# Patient Record
Sex: FEMALE | Race: WHITE | NOT HISPANIC OR LATINO | Employment: UNEMPLOYED | URBAN - METROPOLITAN AREA
[De-identification: names, ages, dates, MRNs, and addresses within clinical notes are randomized per-mention and may not be internally consistent; named-entity substitution may affect disease eponyms.]

---

## 2017-01-21 ENCOUNTER — COMMUNICATION - HEALTHEAST (OUTPATIENT)
Dept: FAMILY MEDICINE | Facility: CLINIC | Age: 62
End: 2017-01-21

## 2017-01-21 DIAGNOSIS — N32.81 OVERACTIVE BLADDER: ICD-10-CM

## 2017-02-02 ENCOUNTER — OFFICE VISIT - HEALTHEAST (OUTPATIENT)
Dept: FAMILY MEDICINE | Facility: CLINIC | Age: 62
End: 2017-02-02

## 2017-02-02 DIAGNOSIS — L57.0 ACTINIC KERATOSIS: ICD-10-CM

## 2017-02-02 DIAGNOSIS — N32.81 OVERACTIVE BLADDER: ICD-10-CM

## 2017-03-07 ENCOUNTER — HOSPITAL ENCOUNTER (OUTPATIENT)
Dept: MAMMOGRAPHY | Facility: CLINIC | Age: 62
Discharge: HOME | End: 2017-03-07
Attending: FAMILY MEDICINE

## 2017-03-07 DIAGNOSIS — Z12.31 VISIT FOR SCREENING MAMMOGRAM: ICD-10-CM

## 2017-06-08 ENCOUNTER — COMMUNICATION - HEALTHEAST (OUTPATIENT)
Dept: FAMILY MEDICINE | Facility: CLINIC | Age: 62
End: 2017-06-08

## 2017-06-08 DIAGNOSIS — K59.00 UNSPECIFIED CONSTIPATION: ICD-10-CM

## 2017-06-09 ENCOUNTER — COMMUNICATION - HEALTHEAST (OUTPATIENT)
Dept: FAMILY MEDICINE | Facility: CLINIC | Age: 62
End: 2017-06-09

## 2017-06-30 ENCOUNTER — OFFICE VISIT - HEALTHEAST (OUTPATIENT)
Dept: FAMILY MEDICINE | Facility: CLINIC | Age: 62
End: 2017-06-30

## 2017-06-30 DIAGNOSIS — K59.04 CHRONIC IDIOPATHIC CONSTIPATION: ICD-10-CM

## 2017-06-30 DIAGNOSIS — R35.0 INCREASED URINARY FREQUENCY: ICD-10-CM

## 2017-06-30 DIAGNOSIS — E66.3 OVERWEIGHT (BMI 25.0-29.9): ICD-10-CM

## 2017-06-30 ASSESSMENT — MIFFLIN-ST. JEOR: SCORE: 1310.39

## 2017-07-10 ENCOUNTER — COMMUNICATION - HEALTHEAST (OUTPATIENT)
Dept: FAMILY MEDICINE | Facility: CLINIC | Age: 62
End: 2017-07-10

## 2017-07-16 ENCOUNTER — RECORDS - HEALTHEAST (OUTPATIENT)
Dept: ADMINISTRATIVE | Facility: OTHER | Age: 62
End: 2017-07-16

## 2017-10-11 ENCOUNTER — RECORDS - HEALTHEAST (OUTPATIENT)
Dept: ADMINISTRATIVE | Facility: OTHER | Age: 62
End: 2017-10-11

## 2017-12-01 ENCOUNTER — RECORDS - HEALTHEAST (OUTPATIENT)
Dept: ADMINISTRATIVE | Facility: OTHER | Age: 62
End: 2017-12-01

## 2017-12-27 ENCOUNTER — OFFICE VISIT - HEALTHEAST (OUTPATIENT)
Dept: FAMILY MEDICINE | Facility: CLINIC | Age: 62
End: 2017-12-27

## 2017-12-27 DIAGNOSIS — L82.1 OTHER SEBORRHEIC KERATOSIS: ICD-10-CM

## 2017-12-27 DIAGNOSIS — R05.9 COUGH: ICD-10-CM

## 2017-12-27 ASSESSMENT — MIFFLIN-ST. JEOR: SCORE: 1265.03

## 2018-01-17 ENCOUNTER — AMBULATORY - HEALTHEAST (OUTPATIENT)
Dept: FAMILY MEDICINE | Facility: CLINIC | Age: 63
End: 2018-01-17

## 2018-01-17 DIAGNOSIS — L98.9 SKIN LESIONS: ICD-10-CM

## 2018-07-02 ENCOUNTER — OFFICE VISIT - HEALTHEAST (OUTPATIENT)
Dept: FAMILY MEDICINE | Facility: CLINIC | Age: 63
End: 2018-07-02

## 2018-07-02 DIAGNOSIS — R05.9 COUGH: ICD-10-CM

## 2018-07-02 DIAGNOSIS — J18.9 COMMUNITY ACQUIRED PNEUMONIA, UNSPECIFIED LATERALITY: ICD-10-CM

## 2018-07-02 ASSESSMENT — MIFFLIN-ST. JEOR: SCORE: 1231.01

## 2018-07-09 ENCOUNTER — OFFICE VISIT - HEALTHEAST (OUTPATIENT)
Dept: FAMILY MEDICINE | Facility: CLINIC | Age: 63
End: 2018-07-09

## 2018-07-09 DIAGNOSIS — J18.9 COMMUNITY ACQUIRED PNEUMONIA, UNSPECIFIED LATERALITY: ICD-10-CM

## 2018-09-17 ENCOUNTER — RECORDS - HEALTHEAST (OUTPATIENT)
Dept: ADMINISTRATIVE | Facility: OTHER | Age: 63
End: 2018-09-17

## 2018-11-05 ENCOUNTER — OFFICE VISIT - HEALTHEAST (OUTPATIENT)
Dept: FAMILY MEDICINE | Facility: CLINIC | Age: 63
End: 2018-11-05

## 2018-11-05 DIAGNOSIS — K59.00 CONSTIPATION: ICD-10-CM

## 2018-11-05 DIAGNOSIS — Z01.818 PRE-OPERATIVE EXAMINATION: ICD-10-CM

## 2018-11-05 DIAGNOSIS — N81.4 UTERINE PROLAPSE: ICD-10-CM

## 2018-11-05 DIAGNOSIS — N32.81 OVERACTIVE BLADDER: ICD-10-CM

## 2018-11-05 DIAGNOSIS — Z12.11 COLON CANCER SCREENING: ICD-10-CM

## 2018-11-05 LAB
ATRIAL RATE - MUSE: 55 BPM
DIASTOLIC BLOOD PRESSURE - MUSE: NORMAL MMHG
ERYTHROCYTE [DISTWIDTH] IN BLOOD BY AUTOMATED COUNT: 12 % (ref 11–14.5)
HCT VFR BLD AUTO: 42.4 % (ref 35–47)
HGB BLD-MCNC: 14.4 G/DL (ref 12–16)
INTERPRETATION ECG - MUSE: NORMAL
MCH RBC QN AUTO: 30.2 PG (ref 27–34)
MCHC RBC AUTO-ENTMCNC: 34 G/DL (ref 32–36)
MCV RBC AUTO: 89 FL (ref 80–100)
P AXIS - MUSE: 57 DEGREES
PLATELET # BLD AUTO: 248 THOU/UL (ref 140–440)
PMV BLD AUTO: 7.8 FL (ref 7–10)
PR INTERVAL - MUSE: 162 MS
QRS DURATION - MUSE: 74 MS
QT - MUSE: 414 MS
QTC - MUSE: 396 MS
R AXIS - MUSE: 32 DEGREES
RBC # BLD AUTO: 4.77 MILL/UL (ref 3.8–5.4)
SYSTOLIC BLOOD PRESSURE - MUSE: NORMAL MMHG
T AXIS - MUSE: 57 DEGREES
VENTRICULAR RATE- MUSE: 55 BPM
WBC: 6.9 THOU/UL (ref 4–11)

## 2018-11-05 ASSESSMENT — MIFFLIN-ST. JEOR: SCORE: 1227.15

## 2018-11-06 ENCOUNTER — COMMUNICATION - HEALTHEAST (OUTPATIENT)
Dept: FAMILY MEDICINE | Facility: CLINIC | Age: 63
End: 2018-11-06

## 2018-11-27 ENCOUNTER — RECORDS - HEALTHEAST (OUTPATIENT)
Dept: ADMINISTRATIVE | Facility: OTHER | Age: 63
End: 2018-11-27

## 2018-11-28 ASSESSMENT — MIFFLIN-ST. JEOR: SCORE: 1223.98

## 2018-12-03 ENCOUNTER — ANESTHESIA - HEALTHEAST (OUTPATIENT)
Dept: SURGERY | Facility: CLINIC | Age: 63
End: 2018-12-03

## 2018-12-04 ENCOUNTER — SURGERY - HEALTHEAST (OUTPATIENT)
Dept: SURGERY | Facility: CLINIC | Age: 63
End: 2018-12-04

## 2018-12-04 ASSESSMENT — MIFFLIN-ST. JEOR: SCORE: 1221.71

## 2018-12-10 ENCOUNTER — COMMUNICATION - HEALTHEAST (OUTPATIENT)
Dept: FAMILY MEDICINE | Facility: CLINIC | Age: 63
End: 2018-12-10

## 2019-06-04 ENCOUNTER — RECORDS - HEALTHEAST (OUTPATIENT)
Dept: ADMINISTRATIVE | Facility: OTHER | Age: 64
End: 2019-06-04

## 2019-09-16 ENCOUNTER — OFFICE VISIT - HEALTHEAST (OUTPATIENT)
Dept: FAMILY MEDICINE | Facility: CLINIC | Age: 64
End: 2019-09-16

## 2019-09-16 DIAGNOSIS — D64.9 POSTOPERATIVE ANEMIA: ICD-10-CM

## 2019-09-16 DIAGNOSIS — Z23 IMMUNIZATION DUE: ICD-10-CM

## 2019-09-16 DIAGNOSIS — Z13.220 LIPID SCREENING: ICD-10-CM

## 2019-09-16 DIAGNOSIS — L98.9 SKIN LESION: ICD-10-CM

## 2019-09-16 DIAGNOSIS — Z78.0 ASYMPTOMATIC MENOPAUSAL STATE: ICD-10-CM

## 2019-09-16 DIAGNOSIS — Z00.00 ROUTINE GENERAL MEDICAL EXAMINATION AT A HEALTH CARE FACILITY: ICD-10-CM

## 2019-09-16 DIAGNOSIS — Z12.4 SCREENING FOR MALIGNANT NEOPLASM OF CERVIX: ICD-10-CM

## 2019-09-16 DIAGNOSIS — Z13.1 SCREENING FOR DIABETES MELLITUS: ICD-10-CM

## 2019-09-16 LAB
ANION GAP SERPL CALCULATED.3IONS-SCNC: 11 MMOL/L (ref 5–18)
BUN SERPL-MCNC: 16 MG/DL (ref 8–22)
CALCIUM SERPL-MCNC: 9.6 MG/DL (ref 8.5–10.5)
CHLORIDE BLD-SCNC: 105 MMOL/L (ref 98–107)
CHOLEST SERPL-MCNC: 246 MG/DL
CO2 SERPL-SCNC: 24 MMOL/L (ref 22–31)
CREAT SERPL-MCNC: 0.79 MG/DL (ref 0.6–1.1)
ERYTHROCYTE [DISTWIDTH] IN BLOOD BY AUTOMATED COUNT: 13 % (ref 11–14.5)
FASTING STATUS PATIENT QL REPORTED: YES
GFR SERPL CREATININE-BSD FRML MDRD: >60 ML/MIN/1.73M2
GLUCOSE BLD-MCNC: 93 MG/DL (ref 70–125)
HCT VFR BLD AUTO: 41.9 % (ref 35–47)
HDLC SERPL-MCNC: 85 MG/DL
HGB BLD-MCNC: 14.6 G/DL (ref 12–16)
LDLC SERPL CALC-MCNC: 148 MG/DL
MCH RBC QN AUTO: 31.1 PG (ref 27–34)
MCHC RBC AUTO-ENTMCNC: 34.9 G/DL (ref 32–36)
MCV RBC AUTO: 89 FL (ref 80–100)
PLATELET # BLD AUTO: 236 THOU/UL (ref 140–440)
PMV BLD AUTO: 7.6 FL (ref 7–10)
POTASSIUM BLD-SCNC: 4.3 MMOL/L (ref 3.5–5)
RBC # BLD AUTO: 4.71 MILL/UL (ref 3.8–5.4)
SODIUM SERPL-SCNC: 140 MMOL/L (ref 136–145)
TRIGL SERPL-MCNC: 65 MG/DL
WBC: 5.5 THOU/UL (ref 4–11)

## 2019-09-16 ASSESSMENT — MIFFLIN-ST. JEOR: SCORE: 1217.41

## 2019-09-17 LAB
HPV SOURCE: NORMAL
HUMAN PAPILLOMA VIRUS 16 DNA: NEGATIVE
HUMAN PAPILLOMA VIRUS 18 DNA: NEGATIVE
HUMAN PAPILLOMA VIRUS FINAL DIAGNOSIS: NORMAL
HUMAN PAPILLOMA VIRUS OTHER HR: NEGATIVE
SPECIMEN DESCRIPTION: NORMAL

## 2019-09-23 ENCOUNTER — COMMUNICATION - HEALTHEAST (OUTPATIENT)
Dept: FAMILY MEDICINE | Facility: CLINIC | Age: 64
End: 2019-09-23

## 2019-09-24 ENCOUNTER — COMMUNICATION - HEALTHEAST (OUTPATIENT)
Dept: FAMILY MEDICINE | Facility: CLINIC | Age: 64
End: 2019-09-24

## 2019-10-28 ENCOUNTER — RECORDS - HEALTHEAST (OUTPATIENT)
Dept: BONE DENSITY | Facility: CLINIC | Age: 64
End: 2019-10-28

## 2019-10-28 ENCOUNTER — RECORDS - HEALTHEAST (OUTPATIENT)
Dept: ADMINISTRATIVE | Facility: OTHER | Age: 64
End: 2019-10-28

## 2019-10-28 DIAGNOSIS — Z78.0 ASYMPTOMATIC MENOPAUSAL STATE: ICD-10-CM

## 2019-11-04 ENCOUNTER — COMMUNICATION - HEALTHEAST (OUTPATIENT)
Dept: FAMILY MEDICINE | Facility: CLINIC | Age: 64
End: 2019-11-04

## 2019-11-05 ENCOUNTER — COMMUNICATION - HEALTHEAST (OUTPATIENT)
Dept: FAMILY MEDICINE | Facility: CLINIC | Age: 64
End: 2019-11-05

## 2019-11-08 ENCOUNTER — RECORDS - HEALTHEAST (OUTPATIENT)
Dept: ADMINISTRATIVE | Facility: OTHER | Age: 64
End: 2019-11-08

## 2019-11-12 ENCOUNTER — OFFICE VISIT (OUTPATIENT)
Dept: VASCULAR SURGERY | Facility: CLINIC | Age: 64
End: 2019-11-12
Payer: COMMERCIAL

## 2019-11-12 PROCEDURE — 99203 OFFICE O/P NEW LOW 30 MIN: CPT | Performed by: SURGERY

## 2019-11-12 NOTE — PROGRESS NOTES
[facility]SULTATION    HPI:    Nandini Yang is a pleasant 64 year old female who presents with complaints of right lower extremity pain and varicose veins.  Varicose veins have been present for 2 to 3 years but numerous telangiectasias have been present since she was in her 30s.. The pain is described as an aching, tiredness, heaviness, throbbing, worse when standing for long periods of time and improving with walking or exercise.  The patient has used compression hose intermittently for years.    She has no history of deep vein thrombosis, superficial thrombophlebitis or bleeding from her veins.  She does feel that her symptoms interfere with actives of daily living because it make it difficult for her to stand for long peers of time due to the throbbing, caused her to sit and elevate her legs.    PAST MEDICAL HISTORY:   Restless leg syndrome    PAST SURGICAL HISTORY:   Anterior and posterior vaginal repair, breast biopsy 1999, cystoscopy 2018, laparoscopic supracervical hysterectomy with bilateral salpingo-oophorectomy December 2018    FAMILY HISTORY:   Negative for varicose veins    SOCIAL HISTORY:   Non-smoker, does not use alcohol    REVIEW OF SYSTEMS: Review Of Systems  Skin: negative  Eyes: negative  Ears/Nose/Throat: negative  Respiratory: No shortness of breath, dyspnea on exertion, cough, or hemoptysis  Cardiovascular: negative  Gastrointestinal: negative  Genitourinary: negative  Musculoskeletal: back pain  Neurologic: negative  Psychiatric: Anxiety, panic attack  Hematologic/Lymphatic/Immunologic: negative  Endocrine: negative      Vital signs:  There were no vitals taken for this visit.    No current outpatient medications on file.       PHYSICAL EXAM:  General: Pleasant, NAD.   HEENT: Normocephalic, atraumatic, external ears and nose normal.   Respiratory: Normal respiratory effort.   Cardiovascular: Pulse is regular.   Musculoskeletal: Gait and station normal.  The joints of her fingers and  toes without deformity.  There is no cyanosis of her nailbeds.   EXTREMITIES: Right lower extremity: 4 mm varicosities about the posterior medial right thigh and extending posteriorly cephalad toward the buttock crease.  Venous stasis changes and no significant edema.  Scattered telangiectasias.  Left lower extremity: No significant varicose veins are appreciated.  She has a few scattered telangiectasias but no edema or venous stasis changes.  PULSES: R/L (3=normal pulse, 0=no palpable pulse) dorsalis pedis: 3/3; posterior tibial: 3/3.      Neurologic: Grossly normal  Psychiatric: Mood, affect, judgment and insight are normal     ASSESSMENT:  CEAP 2 right lower extremity chronic venous insufficiency.  We discussed options of continued conservative management with use of compression hose, leg elevation, dietary measures and exercise.  She has pursued these measures but feels in spite of the exercise etc., her symptoms are interfering with actives of daily living.  Therefore she would like to pursue venous duplex ultrasound to assess for underlying valve incompetence.  These may well be due to pelvic source varicose veins.    PLAN:  Right lower extremity venous duplex ultrasound.  She may wish to have cosmetic sclerotherapy of telangiectasias.  Details and risks were discussed.  Estimates were given.     Cade Esquivel MD

## 2019-11-20 DIAGNOSIS — I83.811 VARICOSE VEINS OF RIGHT LOWER EXTREMITY WITH PAIN: Primary | ICD-10-CM

## 2019-12-06 ENCOUNTER — RECORDS - HEALTHEAST (OUTPATIENT)
Dept: ADMINISTRATIVE | Facility: OTHER | Age: 64
End: 2019-12-06

## 2020-01-21 ENCOUNTER — OFFICE VISIT (OUTPATIENT)
Dept: VASCULAR SURGERY | Facility: CLINIC | Age: 65
End: 2020-01-21

## 2020-01-21 ENCOUNTER — ANCILLARY PROCEDURE (OUTPATIENT)
Dept: ULTRASOUND IMAGING | Facility: CLINIC | Age: 65
End: 2020-01-21
Attending: SURGERY
Payer: COMMERCIAL

## 2020-01-21 ENCOUNTER — MEDICAL CORRESPONDENCE (OUTPATIENT)
Dept: HEALTH INFORMATION MANAGEMENT | Facility: CLINIC | Age: 65
End: 2020-01-21

## 2020-01-21 ENCOUNTER — OFFICE VISIT (OUTPATIENT)
Dept: VASCULAR SURGERY | Facility: CLINIC | Age: 65
End: 2020-01-21
Payer: COMMERCIAL

## 2020-01-21 DIAGNOSIS — Z53.9 ERRONEOUS ENCOUNTER--DISREGARD: Primary | ICD-10-CM

## 2020-01-21 DIAGNOSIS — I78.1 SPIDER TELANGIECTASIS OF SKIN: Primary | ICD-10-CM

## 2020-01-21 DIAGNOSIS — I83.811 VARICOSE VEINS OF RIGHT LOWER EXTREMITY WITH PAIN: ICD-10-CM

## 2020-01-21 PROCEDURE — 36468 NJX SCLRSNT SPIDER VEINS: CPT | Performed by: SURGERY

## 2020-01-21 PROCEDURE — 93971 EXTREMITY STUDY: CPT | Mod: RT | Performed by: SURGERY

## 2020-01-21 PROCEDURE — 99213 OFFICE O/P EST LOW 20 MIN: CPT | Performed by: SURGERY

## 2020-01-21 PROCEDURE — S9999 SALES TAX: HCPCS | Performed by: SURGERY

## 2020-01-21 RX ORDER — QUETIAPINE 150 MG/1
150 TABLET, FILM COATED, EXTENDED RELEASE ORAL DAILY
COMMUNITY
Start: 2019-12-30 | End: 2022-11-23

## 2020-01-21 RX ORDER — HYDROXYZINE PAMOATE 50 MG/1
50 CAPSULE ORAL PRN
COMMUNITY
Start: 2019-06-13 | End: 2022-11-23

## 2020-01-21 RX ORDER — CLONAZEPAM 1 MG/1
1 TABLET ORAL PRN
COMMUNITY
End: 2021-09-21

## 2020-01-21 RX ORDER — FLUTICASONE PROPIONATE 50 MCG
50 SPRAY, SUSPENSION (ML) NASAL PRN
COMMUNITY

## 2020-01-21 RX ORDER — CLONAZEPAM 1 MG/1
1 TABLET ORAL PRN
Refills: 2 | COMMUNITY
Start: 2019-08-23 | End: 2022-11-23

## 2020-01-22 NOTE — PROGRESS NOTES
[facility] Note    Nandini Yang presents in follow-up of right lower extremity pain and varicose veins.  Please see my consultation of 11/12/2019 for details.  She returns today for right lower extremity venous duplex ultrasound.    Physical Exam  General: Pleasant female in no acute distress.  Blood pressure 108/75, pulse 75  Extremities: Right lower extremity: She has 4 to 5 mm varicosities coursing from the proximal posterior right thigh, down the posterior thigh, to the lateral popliteal space and onto the posterior lateral right leg.  There are numerous reticular veins and telangiectasias over the posterior thigh and also over her anterior right leg and ankle.    Left lower extremity: No varicose veins are appreciated.  She has scattered reticular veins and spider veins over the thigh and leg.  There are no venous stasis changes or edema.    Ultrasound:  Right lower extremity: No evidence of deep vein thrombosis.  The right common femoral vein is incompetent.  The right great saphenous vein, small saphenous vein, vein of Giacomini and anterior extensor saphenous veins are competent.  There is a pelvic source varicose vein measuring 5.6 mm in diameter with reflux time of 676 ms coursing down the posterior thigh.    Left lower extremity: No evidence of deep vein thrombosis with normal phasicity, compression and augmentation in the left common femoral vein    Assessment:  Her left leg pain and varicose veins arise from a pelvic source, not from saphenous incompetence.  Her symptoms are interfering with actives of daily living despite the use of compression hose for more than 3 months.    We discussed options of continued conservative management with use of compression hose, leg elevation, dietary measures and exercise.  As she has pursued these measures and has not had relief of her discomfort, I feel that these veins could be treated with ultrasound-guided sclerotherapy.  This would be considered  medically necessary, given her symptoms and failure of conservative measures.    Details of the procedure including risks of allergic reaction, deep vein thrombosis, ulceration, hyperpigmentation, superficial thrombophlebitis and matting were discussed.  The patient voiced understanding and her questions were answered.    Plan:  We will submit this for insurance approval and hopefully can proceed in the near future.  Estimates were given.    The patient is interested in cosmetic sclerotherapy for telangiectasias over both lower extremities.  She understands that cosmetic sclerotherapy will not be covered by insurance and will be her responsibility.  Please see the below procedure note regarding the cosmetic sclerotherapy.    Cade Esquivel MD                 [facility] Procedure Note    Nandini PATEL Trey  January 22, 2020    Details of the procedure including risks of allergic reaction, ulceration, hyperpigmentation and trapped blood were discussed.  Patient voiced understanding and wished to proceed.  Informed consent was obtained.    I donned the Syris headlight and injected numerous telangiectasias from the ankles to her thighs bilaterally.  The varicose vein on the posterior right thigh and popliteal fossa were not injected as this will need to be treated with medically necessary ultrasound-guided sclerotherapy at a later date.    We used 0.5% polidocanol in a 1 part polidocanol to 2 parts air mixture using a total of 3 syringes, 9 mL) of foam.    She tolerated procedure well but evidence of allergic reaction or other complications.  We had her walk for 10 minutes prior to getting car to drive home after donning thigh-high compression hose.  She will return in 6 weeks in follow-up.    Sclerotherapy  Date/Time: 1/22/2020 8:29 AM  Performed by: Cade Esquivel MD  Authorized by: Cade Esquivel MD     Time out: Immediately prior to the procedure a time out was called    Type:   Cosmetic  Session:  Full  Procedure side:  Bilateral  Solution/Amount:  .5% STD  Syringes::  3 (9 mL)  Patient tolerance:  Patient tolerated the procedure well with no immediate complications  Wrap/Hose:  Chuck Esquivel MD

## 2020-03-09 ENCOUNTER — RECORDS - HEALTHEAST (OUTPATIENT)
Dept: ADMINISTRATIVE | Facility: OTHER | Age: 65
End: 2020-03-09

## 2020-04-13 ENCOUNTER — TELEPHONE (OUTPATIENT)
Dept: VASCULAR SURGERY | Facility: CLINIC | Age: 65
End: 2020-04-13

## 2020-04-13 NOTE — TELEPHONE ENCOUNTER
"April 13, 2020    Patient called [facility] requesting an appointment with Dr. Esquivel.     Patient stated that she was scheduled for a \"very important appointment\" on 3/17/2020 but it was cancelled due to COVID-19.     Routing to [facility] RN Pool for review and correspondence.     [facility]  [facility]    Office: [phone]  Fax: [phone]   "

## 2020-04-14 NOTE — TELEPHONE ENCOUNTER
Called pt back and had to [facility]. Informed pt that she is on our reschedule list and we will be contacting her to get her 3/17 appt rescheduled as soon as our clinic opens back up. Informed pt that Dr. Esquivel is only doing virtual (video or phone) visits at this time due to the current health crisis and no patients of ours are being seen in the clinic unless it is an emergent need and pt was scheduled for sclero and U/S guided sclero which we will not be able to do until our clinic is back open. Informed pt that if she has further questions or concerns, to call us back at our main number ([phone]) if she'd like to set up a virtual visit (Dr. Esquivel is available Monday 4/27 AM and Tuesday 4/28 AM) or if she has any other questions or concerns and those may have to be relayed to me as I am working remotely at this time.    Amber Dowd, RN

## 2020-09-08 ENCOUNTER — RECORDS - HEALTHEAST (OUTPATIENT)
Dept: ADMINISTRATIVE | Facility: OTHER | Age: 65
End: 2020-09-08

## 2020-09-18 ENCOUNTER — OFFICE VISIT - HEALTHEAST (OUTPATIENT)
Dept: FAMILY MEDICINE | Facility: CLINIC | Age: 65
End: 2020-09-18

## 2020-09-18 DIAGNOSIS — Z13.220 LIPID SCREENING: ICD-10-CM

## 2020-09-18 DIAGNOSIS — L82.0 INFLAMED SEBORRHEIC KERATOSIS: ICD-10-CM

## 2020-09-18 DIAGNOSIS — G47.00 INSOMNIA, UNSPECIFIED TYPE: ICD-10-CM

## 2020-09-18 DIAGNOSIS — Z00.00 ROUTINE GENERAL MEDICAL EXAMINATION AT A HEALTH CARE FACILITY: ICD-10-CM

## 2020-09-18 DIAGNOSIS — Z23 IMMUNIZATION DUE: ICD-10-CM

## 2020-09-18 DIAGNOSIS — G47.09 OTHER INSOMNIA: ICD-10-CM

## 2020-09-18 DIAGNOSIS — Z13.1 SCREENING FOR DIABETES MELLITUS: ICD-10-CM

## 2020-09-18 LAB
ALBUMIN SERPL-MCNC: 4.1 G/DL (ref 3.5–5)
ALP SERPL-CCNC: 77 U/L (ref 45–120)
ALT SERPL W P-5'-P-CCNC: 14 U/L (ref 0–45)
ANION GAP SERPL CALCULATED.3IONS-SCNC: 9 MMOL/L (ref 5–18)
AST SERPL W P-5'-P-CCNC: 19 U/L (ref 0–40)
BILIRUB SERPL-MCNC: 0.9 MG/DL (ref 0–1)
BUN SERPL-MCNC: 15 MG/DL (ref 8–22)
CALCIUM SERPL-MCNC: 9.4 MG/DL (ref 8.5–10.5)
CHLORIDE BLD-SCNC: 105 MMOL/L (ref 98–107)
CHOLEST SERPL-MCNC: 235 MG/DL
CO2 SERPL-SCNC: 26 MMOL/L (ref 22–31)
CREAT SERPL-MCNC: 0.77 MG/DL (ref 0.6–1.1)
FASTING STATUS PATIENT QL REPORTED: YES
GFR SERPL CREATININE-BSD FRML MDRD: >60 ML/MIN/1.73M2
GLUCOSE BLD-MCNC: 94 MG/DL (ref 70–125)
HDLC SERPL-MCNC: 78 MG/DL
LDLC SERPL CALC-MCNC: 136 MG/DL
POTASSIUM BLD-SCNC: 4.5 MMOL/L (ref 3.5–5)
PROT SERPL-MCNC: 6.6 G/DL (ref 6–8)
SODIUM SERPL-SCNC: 140 MMOL/L (ref 136–145)
TRIGL SERPL-MCNC: 106 MG/DL

## 2020-09-18 ASSESSMENT — MIFFLIN-ST. JEOR: SCORE: 1217.18

## 2020-09-22 ENCOUNTER — COMMUNICATION - HEALTHEAST (OUTPATIENT)
Dept: FAMILY MEDICINE | Facility: CLINIC | Age: 65
End: 2020-09-22

## 2020-09-24 ENCOUNTER — COMMUNICATION - HEALTHEAST (OUTPATIENT)
Dept: FAMILY MEDICINE | Facility: CLINIC | Age: 65
End: 2020-09-24

## 2020-10-01 ENCOUNTER — COMMUNICATION - HEALTHEAST (OUTPATIENT)
Dept: FAMILY MEDICINE | Facility: CLINIC | Age: 65
End: 2020-10-01

## 2020-10-06 ENCOUNTER — OFFICE VISIT (OUTPATIENT)
Dept: VASCULAR SURGERY | Facility: CLINIC | Age: 65
End: 2020-10-06
Payer: COMMERCIAL

## 2020-10-06 DIAGNOSIS — I83.811 VARICOSE VEINS OF RIGHT LOWER EXTREMITY WITH PAIN: Primary | ICD-10-CM

## 2020-10-06 PROCEDURE — 36471 NJX SCLRSNT MLT INCMPTNT VN: CPT | Mod: RT | Performed by: SURGERY

## 2020-10-06 PROCEDURE — 76942 ECHO GUIDE FOR BIOPSY: CPT | Performed by: SURGERY

## 2020-10-06 NOTE — LETTER
10/6/2020         RE: Nandini Yang  [id number] [facility] [id number]        Dear Colleague,    Thank you for referring your patient, Nandini Yang, to the [facility] [place]. Please see a copy of my visit note below.      Procedure Description  Details the procedure including risks of allergic reaction, deep vein thrombosis, ulceration, hyperpigmentation, superficial thrombophlebitis were all discussed with the patient.  Patient voiced understanding, questions were answered and informed consent obtained.    Under ultrasound guidance, the *** lower extremity symptomatic, incompetent *** tributaries were localized and a 27-gauge needle directed into them. *** Polidocanol foam in a one part polidocanol to 2.5 parts air mixture was injected into the veins under ultrasound guidance.  The veins were noted to be in tight spasm and filled with solution. ***   I had the patient perform ankle pumps on the table.  After completing the sclerotherapy, the leg was cleaned with saline solution and compression hose applied.  We had the patient walk for 10 minutes prior to leaving the clinic. She tolerated the procedure well without evidence of allergic reaction or other complications.  Post procedure instructions and restrictions were given.    The patient will return in 4 weeks for follow-up.    Cade Esquivel MD  CP [facility]    Schedule description  Details of the procedure including risks of allergic reaction, deep vein thrombosis, ulceration, hyperpigmentation, superficial thrombophlebitis and the need for more than 1 session were discussed.  The patient voiced understanding and wished to proceed.  Informed consent was obtained.    I had the patient lie prone on our procedure room table at the table in slight reverse Trendelenburg position.  I interrogated the proximal posterior medial right thigh and noted a sizable varicosities approximately 3 to 4 cm distal to the gluteal fold,  isolated them under ultrasound guidance and directed a 27-gauge needle into them injecting 1% polidocanol foam in a 1 part polidocanol to 2.5 parts air mixture.  I then marched down the posterior thigh injecting at the mid thigh, distal lateral thigh and in the proximal mid posterior calf as the vein coursing down the thigh and calf.  I feel the vein nicely from the proximal mid calf all the way up to the gluteal fold.    Had the patient perform ankle pumps.  We cleaned the leg and then had her don thigh-high compression and had her walk for 10 to 15 minutes prior to getting a car drive home.  Post procedure instructions were given in verbal and written form questions answered.  Tolerated the procedure well without evidence of allergic reaction or other complications.    She will return in 1 month in follow-up with possible treatment at that time.  Additionally, she may wish to have some treatment of telangiectasias for cosmetic purposes with full understanding that treatment of the telangiectasias will not be covered by insurance and will be her responsibility.  She voiced understanding.    There were no vitals taken for this visit.    LAI Esquivel MD    Dictated using Dragon voice recognition software which may result in transcription errors      [facility] Procedure Note    Nandini Yang  October 6, 2020    Nandini Yang is a 65 year old year old female. She presents for Sclerotherapy      There were no vitals taken for this visit.    Flowsheet Data 10/6/2020   Side: Left       Sclerotherapy    Date/Time: 10/6/2020 4:50 PM  Performed by: Cade Esquivel MD  Authorized by: Cade Esquivel MD     Time out: Immediately prior to the procedure a time out was called    Type:  Medically Necessary  Vein:  Multiple Veins  Yes    Procedure side:  Right  Solution/Amount:  1% POLIDOCANOL  Syringes::  2  Patient tolerance:  Patient tolerated the procedure well with no immediate  complications        Cade Esquivel MD      Again, thank you for allowing me to participate in the care of your patient.        Sincerely,        Cade Esquivel MD

## 2020-10-06 NOTE — PROGRESS NOTES
Procedure description  Details of the procedure including risks of allergic reaction, deep vein thrombosis, ulceration, hyperpigmentation, superficial thrombophlebitis and the need for more than 1 session were discussed.  The patient voiced understanding and wished to proceed.  Informed consent was obtained.    I had the patient lie prone on our procedure room table at the table in slight reverse Trendelenburg position.  I interrogated the proximal posterior medial right thigh and noted a sizable varicosities approximately 3 to 4 cm distal to the gluteal fold, isolated them under ultrasound guidance and directed a 27-gauge needle into them injecting 1% polidocanol foam in a 1 part polidocanol to 2.5 parts air mixture.  I then marched down the posterior thigh injecting at the mid thigh, distal lateral thigh and in the proximal mid posterior calf as the vein coursing down the thigh and calf.  I feel the vein nicely from the proximal mid calf all the way up to the gluteal fold.    Had the patient perform ankle pumps.  We cleaned the leg and then had her don thigh-high compression and had her walk for 10 to 15 minutes prior to getting a car drive home.  Post procedure instructions were given in verbal and written form questions answered.  Tolerated the procedure well without evidence of allergic reaction or other complications.    She will return in 1 month in follow-up with possible treatment at that time.  Additionally, she may wish to have some treatment of telangiectasias for cosmetic purposes with full understanding that treatment of the telangiectasias will not be covered by insurance and will be her responsibility.  She voiced understanding.    There were no vitals taken for this visit.    LAI Esquivel MD    Dictated using Dragon voice recognition software which may result in transcription errors      [facility] Procedure Note    Nandini Yang  October 6, 2020    Sclerotherapy    Date/Time:  10/6/2020 4:50 PM  Performed by: Cade Esquivel MD  Authorized by: Cade Esquivel MD     Time out: Immediately prior to the procedure a time out was called    Type:  Medically Necessary  Vein:  Multiple Veins  Yes    Procedure side:  Right  Solution/Amount:  1% POLIDOCANOL  Syringes::  2  Patient tolerance:  Patient tolerated the procedure well with no immediate complications        Cade Esquivel MD

## 2020-11-08 ENCOUNTER — COMMUNICATION - HEALTHEAST (OUTPATIENT)
Dept: FAMILY MEDICINE | Facility: CLINIC | Age: 65
End: 2020-11-08

## 2020-11-08 DIAGNOSIS — G47.00 INSOMNIA, UNSPECIFIED TYPE: ICD-10-CM

## 2020-11-12 ENCOUNTER — TELEPHONE (OUTPATIENT)
Dept: VASCULAR SURGERY | Facility: CLINIC | Age: 65
End: 2020-11-12

## 2020-11-12 NOTE — TELEPHONE ENCOUNTER
Pt called stating the back of her right knee has some firmness and tends to throb when exercising. Pt had U/S guided sclerotherapy on 10/6/2020 with Dr. Esquivel. Explained to pt these symptoms are normal following injection of deeper varicose veins. The firmness and tenderness may take up to 3-6 months to resolve. However, it sounds like there is probably some trapped blood in that vein as well as pt stated it was darker colored too. Informed pt that Dr. Esquivel may need to release some trapped blood in that vein to allow it to heal quicker and release some of the pressure it sounds like she is feeling. Instructed pt to apply warm compress for comfort and to wear her compression while working out if that helps it feel better which she had said it does.    Pt has follow up appt on 12/1 with Dr. Esquivel.    Pt in agreement with plan and had no further questions.    Amber Dowd, BSN, RN  [facility] [employer]  [facility]

## 2020-12-01 ENCOUNTER — OFFICE VISIT (OUTPATIENT)
Dept: VASCULAR SURGERY | Facility: CLINIC | Age: 65
End: 2020-12-01

## 2020-12-01 DIAGNOSIS — I83.811 VARICOSE VEINS OF RIGHT LOWER EXTREMITY WITH PAIN: ICD-10-CM

## 2020-12-01 DIAGNOSIS — I78.1 SPIDER TELANGIECTASIS OF SKIN: Primary | ICD-10-CM

## 2020-12-01 PROCEDURE — S9999 SALES TAX: HCPCS | Performed by: SURGERY

## 2020-12-01 PROCEDURE — 36468 NJX SCLRSNT SPIDER VEINS: CPT | Performed by: SURGERY

## 2020-12-01 NOTE — PROGRESS NOTES
[facility] Procedure Note    Nandini Yang  December 1, 2020    Indications:  1.  Follow-up 4 weeks status post ultrasound-guided, medically necessary sclerotherapy of the source varicose veins right lower extremity  2. Bilateral lower extremity spider telangiectasias of cosmetic concern    Procedure:  1.  Bilateral lower extremity cosmetic sclerotherapy  2.  Evacuation intraluminal thrombus from previously treated pelvic source varicose vein    Procedure description  The patient returns following medication ultrasound-guided sclerotherapy of right lower extremity pelvic source varicose veins.  These coursed from the proximal posterior medial right thigh, down the posterior thigh, lateral to the right knee and onto the posterior lateral right calf.  The patient is concerned that she developed some discoloration overlying the treated vein especially on the distal posterior lateral right thigh and proximal lateral calf.    Indeed, on physical exam, she has developed rather impressive matting overlying the firm induration and mild ecchymosis from the underlying, treated varicose vein.  Spiders are quite numerous and the matting intense.    I discussed details of sclerotherapy including risks of allergic reaction, deep vein thrombosis, ulceration, hyperpigmentation and further matting.  The patient voiced understanding and agreed to proceed.  Informed consent was obtained.    Cosmetic sclerotherapy  I donned the Syris headlight and treated numerous telangiectasias from the ankles to just above the knees bilaterally.  I was very careful to treat the matted areas surrounding the varicose vein on the distal posterior lateral right thigh, lateral to the right knee and onto the posterior lateral right calf.  I feel that I was able to treat these quite well.  We also treated numerous telangiectasias and small reticular veins in the popliteal fossa's bilaterally.    Removal of trapped blood  I clean the skin overlying  the indurated, previously treated varicose vein on the distal posterior lateral right thigh, proximal lateral popliteal fossa and on the posterior lateral right calf with alcohol, made stab wounds with an 18-gauge needle and expressed rather copious amounts of trapped blood.  This helped relieve some of the firmness and induration.    The area was cleaned with alcohol and then cotton balls applied to the areas of expressed blood.    The patient then donned thigh-high compression hose and walk for 10 minutes prior to getting a car drive home.  She will return in 6 weeks in follow-up.  She tolerated the procedure well without evidence of allergic reaction of complications.    The matting on the distal posterior lateral right thigh, lateral right popliteal fossa and posterior lateral right calf will take several treatments to resolve.  I told the patient that this would likely take 3 months to a year to reach his maximal improvement and that she may be left with some mild hyperpigmentation or matting in the area.  Treatment of the area of matting will be at no charge to her.  She understands that 2-3 more sessions may be necessary.  She voiced understanding.    Sclerotherapy    Date/Time: 12/1/2020 2:13 PM  Performed by: Cade Esquivel MD  Authorized by: Cade Esquivel MD     Time out: Immediately prior to the procedure a time out was called    Type:  Cosmetic  Session:  Limited  Procedure side:  Bilateral  Solution/Amount:  .5 POLIDOCANOL  Syringes::  2 syringes  Evacuation of intraluminal thrombus under sterile conditions without complications.    Patient tolerance:  Patient tolerated the procedure well with no immediate complications  Wrap/Hose:  Chuck Esquivel MD    Dictated using Dragon voice recognition software which may result in transcription errors

## 2020-12-14 ENCOUNTER — TELEPHONE (OUTPATIENT)
Dept: VASCULAR SURGERY | Facility: CLINIC | Age: 65
End: 2020-12-14

## 2020-12-14 NOTE — TELEPHONE ENCOUNTER
"Saw KATHRIN about 2 weeks ago for a sclero and has some questions about that.  She has something called \"matting\".    "

## 2020-12-14 NOTE — TELEPHONE ENCOUNTER
"Called pt back. Pt was wondering if there is anything she can be doing for the matting that she has from the sclerotherapy she had done by Dr. Esquivel on 10/6/2020. According to Dr. Esquivel's note from 12/1/2020 when he followed up with patient: \"She has developed rather impressive matting overlying the firm induration and mild ecchymosis from the underlying, treated varicose vein. Spiders are quite numerous and the matting intense.\" Reminded pt of what Dr. Esquivel informed her of: \"I told the patient that this would likely take 3 months to a year to reach her maximal improvement and that she may be left with some mild hyperpigmentation or matting in the area.\"    Pt plans on doing more injections at her next appt on 1/12/2021. And understands that there isn't really anything else that she can be doing at this time other than continue to wear her compression stockings and being patient with the healing process.    Pt in agreement with plan and had no further questions.    Amber Dowd, JOAQUIMN, RN  [facility]  [facility]  "

## 2021-01-12 ENCOUNTER — OFFICE VISIT (OUTPATIENT)
Dept: VASCULAR SURGERY | Facility: CLINIC | Age: 66
End: 2021-01-12
Payer: COMMERCIAL

## 2021-01-12 DIAGNOSIS — I78.1 SPIDER TELANGIECTASIS OF SKIN: Primary | ICD-10-CM

## 2021-01-12 DIAGNOSIS — I83.91 ASYMPTOMATIC VARICOSE VEINS OF RIGHT LOWER EXTREMITY: ICD-10-CM

## 2021-01-12 PROCEDURE — 99207 PR VEINSOLUTIONS NO CHARGE VISIT: CPT | Performed by: SURGERY

## 2021-01-12 NOTE — LETTER
1/12/2021         RE: Nandini Yang  [id number] [facility] [address]        Dear Colleague,    Thank you for referring your patient, Nandini Yang, to the [facility] [place]. Please see a copy of my visit note below.        [facility] Procedure Note    Nandini Yang  January 13, 2021    Indications:  Telangiectatic matting as complication of ultrasound-guided sclerotherapy of right distal posterior lateral thigh and proximal lateral right leg varicose vein    Procedure:  No charge sclerotherapy to treat proliferative telangiectatic matting complicating cosmetic sclerotherapy right leg varicose vein    Procedure description  Patient had formed rather proliferative telangiectasias of the distal posterior lateral right thigh and possible posterior lateral right calf in the area of a treated varicose vein.  This was much worse at her visit 6 weeks ago and I am pleased to see that is made good progress.  We drained trapped blood from the injected varicosity and perform sclerotherapy of the area of matting 6 weeks ago.    On exam there still remain rather proliferative telangiectasias but much improved from her previous visit.  There is mild hyperpigmentation along the course of the treated varicosity which is again, much improved.    I discussed risks of sclerotherapy including allergic reaction, deep vein thrombosis, ulceration, hyperpigmentation and further matting.  She voiced understanding and wished to proceed.  Informed consent was obtained.    I donned the size headlight and injected telangiectasias over the distal posterior lateral right thigh and proximal lateral right leg with 0.5% polidocanol foam.  I feel like we feel this nicely.    We cleaned the leg and then had the patient don thigh-high compression.  We had her walk for 10 minutes prior to getting a car drive home.  She will return in 6 weeks in follow-up possibly for another session of no charge sclerotherapy to  this limited area.    She tolerated the procedure well without evidence of allergic reaction or other complications.    There were no vitals taken for this visit.    Flowsheet Data 12/1/2020   Side: Right       Sclerotherapy    Date/Time: 1/13/2021 1:10 PM  Performed by: Cade Esquivel MD  Authorized by: Cade Esquivel MD     Time out: Immediately prior to the procedure a time out was called    Type:  No Charge  Procedure side:  Right  Solution/Amount:  .5 POLIDOCANOL  Syringes::  1  Patient tolerance:  Patient tolerated the procedure well with no immediate complications  Wrap/Hose:  Hose        Cade Esquivel MD      Again, thank you for allowing me to participate in the care of your patient.        Sincerely,        Cade Esquivel MD

## 2021-01-13 ENCOUNTER — RECORDS - HEALTHEAST (OUTPATIENT)
Dept: ADMINISTRATIVE | Facility: OTHER | Age: 66
End: 2021-01-13

## 2021-01-13 NOTE — PROGRESS NOTES
[facility] Procedure Note    Nandini Yang  January 13, 2021    Indications:  Telangiectatic matting as complication of ultrasound-guided sclerotherapy of right distal posterior lateral thigh and proximal lateral right leg varicose vein    Procedure:  No charge sclerotherapy to treat proliferative telangiectatic matting complicating cosmetic sclerotherapy right leg varicose vein    Procedure description  Patient had formed rather proliferative telangiectasias of the distal posterior lateral right thigh and possible posterior lateral right calf in the area of a treated varicose vein.  This was much worse at her visit 6 weeks ago and I am pleased to see that is made good progress.  We drained trapped blood from the injected varicosity and perform sclerotherapy of the area of matting 6 weeks ago.    On exam there still remain rather proliferative telangiectasias but much improved from her previous visit.  There is mild hyperpigmentation along the course of the treated varicosity which is again, much improved.    I discussed risks of sclerotherapy including allergic reaction, deep vein thrombosis, ulceration, hyperpigmentation and further matting.  She voiced understanding and wished to proceed.  Informed consent was obtained.    I donned the size headlight and injected telangiectasias over the distal posterior lateral right thigh and proximal lateral right leg with 0.5% polidocanol foam.  I feel like we feel this nicely.    We cleaned the leg and then had the patient don thigh-high compression.  We had her walk for 10 minutes prior to getting a car drive home.  She will return in 6 weeks in follow-up possibly for another session of no charge sclerotherapy to this limited area.    She tolerated the procedure well without evidence of allergic reaction or other complications.    There were no vitals taken for this visit.    Flowsheet Data 12/1/2020   Side: Right       Sclerotherapy    Date/Time: 1/13/2021 1:10  PM  Performed by: Cade Esquivel MD  Authorized by: Cade Esquivel MD     Time out: Immediately prior to the procedure a time out was called    Type:  No Charge  Procedure side:  Right  Solution/Amount:  .5 POLIDOCANOL  Syringes::  1  Patient tolerance:  Patient tolerated the procedure well with no immediate complications  Wrap/Hose:  Chuck Esquivel MD

## 2021-02-23 ENCOUNTER — OFFICE VISIT (OUTPATIENT)
Dept: VASCULAR SURGERY | Facility: CLINIC | Age: 66
End: 2021-02-23

## 2021-02-23 DIAGNOSIS — I78.1 SPIDER TELANGIECTASIS OF SKIN: Primary | ICD-10-CM

## 2021-02-23 PROCEDURE — S9999 SALES TAX: HCPCS | Performed by: SURGERY

## 2021-02-23 PROCEDURE — 36468 NJX SCLRSNT SPIDER VEINS: CPT | Performed by: SURGERY

## 2021-02-23 NOTE — PROGRESS NOTES
[facility] Sclerotherapy Note     Indications:  1.  Residual bilateral lower extremity spider telangiectasias of cosmetic concern  2.  Area of matting distal right posterior lateral thigh and proximal posterior lateral right calf (status post sclerotherapy of a larger varicose vein in this location)     Procedure:  Bilateral lower extremity cosmetic sclerotherapy     Procedure description  Details of procedure including risks of allergic reaction, deep vein thrombosis, ulceration, superficial thrombophlebitis and hyperpigmentation were discussed.  The patient voiced understanding and wished to proceed.  Informed consent was obtained.    We have performed several sessions of sclerotherapy for spider telangiectasias for this patient.  She had a varicose vein on the distal posterior lateral right thigh extending across the lateral popliteal fossa and on the posterior lateral right calf which we treated with ultrasound-guided sclerotherapy.  This vein thrombosed and she had a rather disappointing matting reaction to the from the superficial thrombophlebitis.  I drained the blood from this vein and it resolved nicely with only minor hyperpigmentation on the proximal posterior lateral calf.    I have been treating the area of matting at no charge.  I believe this is the second no charge session with progress being made in his treatment.  She does not seem to be disappointed but is appreciating the progress that we are making and my efforts in treating this.    Today she resisted having other telangiectasias treated on her thighs and calves, therefore there would be a portion of this treatment that would be billable.  She voiced understanding and agrees.    I donned the SY RIS headlight and injected telangiectasias over the distal medial right thigh, distal lateral/posterior lateral right thigh, proximal posterior lateral right calf, medial distal left thigh, medial left calf, lateral left thigh and then had the  patient turned prone and injected areas in the popliteal fossa's and distal posterior lateral thighs bilaterally  We used a total of 6 mL of 0.5% polidocanol foam (although polidocanol to 2 parts air mixture.    She tolerated procedure well but evidence of lower tract of complications and will return in 6 weeks in follow-up.  I will image the area of matting of the right leg in the ultrasound room to ensure that there is nothing deep to this area that needs treatment that would speed resolution of the matting.  She understands that this may never totally resolved but my goal is an 80 to 85% improvement.    Sclerotherapy    Date/Time: 2/23/2021 3:09 PM  Performed by: Cade Esquivel MD  Authorized by: Cade Esquivel MD     Time out: Immediately prior to the procedure a time out was called    Type:  Cosmetic  Session:  Limited  Procedure side:  Bilateral  Solution/Amount:  .5 POLIDOCANOL  Syringes::  2  Patient tolerance:  Patient tolerated the procedure well with no immediate complications  Wrap/Hose:  Chuck Esquivel MD    Dictated using Dragon voice recognition software which may result in transcription errors

## 2021-04-13 ENCOUNTER — OFFICE VISIT (OUTPATIENT)
Dept: VASCULAR SURGERY | Facility: CLINIC | Age: 66
End: 2021-04-13
Payer: COMMERCIAL

## 2021-04-13 DIAGNOSIS — I78.1 SPIDER TELANGIECTASIS OF SKIN: Primary | ICD-10-CM

## 2021-04-13 PROCEDURE — 99207 PR VEINSOLUTIONS NO CHARGE VISIT: CPT | Performed by: SURGERY

## 2021-04-13 RX ORDER — ESZOPICLONE 1 MG/1
1 TABLET, FILM COATED ORAL AT BEDTIME
COMMUNITY
End: 2021-09-21

## 2021-04-13 NOTE — PROGRESS NOTES
[facility] Sclerotherapy Note     Indications:  Telangiectatic matting status post ultrasound-guided sclerotherapy     Procedure:  No charge sclerotherapy telangiectasia matting     Procedure description  Details of procedure including risks of allergic reaction, deep vein thrombosis, ulceration, superficial thrombophlebitis and hyperpigmentation were discussed.  The patient voiced understanding and wished to proceed.  Informed consent was obtained.    I have performed ultrasound-guided sclerotherapy of a larger varicose vein on the right distal posterior lateral thigh extending across the lateral popliteal fossa again, I believe, October 2020.  She developed rather florid superficial phlebitis and postinflammatory telangiectasias in the area.  I felt that treating this at no charge was appropriate.    On exam we are making great progress with only a few residual telangiectasias remaining.  This should be the last session of sclerotherapy required to treat this area.  Is resolving very nicely.    I donned the size headlight and injected telangiectasias in the distal posterior lateral right thigh, just lateral to the popliteal crease and onto the posterior lateral proximal right calf using 0.5% polidocanol foam.  We used a total of 2.5 mL of foam filling the residual veins nicely.    We cleaned the patient's leg, had her perform ankle pumps on the table, applied thigh-high compression and had the patient walk for 10 minutes prior to getting a car drive home.  She tolerated the procedure well without evidence of allergic reaction of complications and will return in the fall if she feels she needs further sclerotherapy.    Sclerotherapy    Date/Time: 4/13/2021 11:42 AM  Performed by: Cade Esquivel MD  Authorized by: Cade Esquivel MD     Type:  No Charge  Procedure side:  Right  Solution/Amount:  .5 POLIDOCANOL  Syringes::  1  Patient tolerance:  Patient tolerated the procedure well with  no immediate complications  Wrap/Hose:  Chuck Esquivel MD    Dictated using Dragon voice recognition software which may result in transcription errors

## 2021-04-20 ENCOUNTER — RECORDS - HEALTHEAST (OUTPATIENT)
Dept: ADMINISTRATIVE | Facility: OTHER | Age: 66
End: 2021-04-20

## 2021-04-27 ENCOUNTER — AMBULATORY - HEALTHEAST (OUTPATIENT)
Dept: NURSING | Facility: CLINIC | Age: 66
End: 2021-04-27

## 2021-05-18 ENCOUNTER — AMBULATORY - HEALTHEAST (OUTPATIENT)
Dept: NURSING | Facility: CLINIC | Age: 66
End: 2021-05-18

## 2021-05-26 ENCOUNTER — RECORDS - HEALTHEAST (OUTPATIENT)
Dept: ADMINISTRATIVE | Facility: CLINIC | Age: 66
End: 2021-05-26

## 2021-05-27 ENCOUNTER — RECORDS - HEALTHEAST (OUTPATIENT)
Dept: ADMINISTRATIVE | Facility: CLINIC | Age: 66
End: 2021-05-27

## 2021-05-28 ENCOUNTER — RECORDS - HEALTHEAST (OUTPATIENT)
Dept: ADMINISTRATIVE | Facility: CLINIC | Age: 66
End: 2021-05-28

## 2021-05-29 ENCOUNTER — RECORDS - HEALTHEAST (OUTPATIENT)
Dept: ADMINISTRATIVE | Facility: CLINIC | Age: 66
End: 2021-05-29

## 2021-05-30 ENCOUNTER — RECORDS - HEALTHEAST (OUTPATIENT)
Dept: ADMINISTRATIVE | Facility: CLINIC | Age: 66
End: 2021-05-30

## 2021-05-31 VITALS — HEIGHT: 68 IN | WEIGHT: 162 LBS | BODY MASS INDEX: 24.55 KG/M2

## 2021-05-31 VITALS — HEIGHT: 68 IN | WEIGHT: 152 LBS | BODY MASS INDEX: 23.04 KG/M2

## 2021-06-01 VITALS — BODY MASS INDEX: 21.9 KG/M2 | WEIGHT: 144.5 LBS | HEIGHT: 68 IN

## 2021-06-01 NOTE — PATIENT INSTRUCTIONS - HE
Advance Directive  Patient s advance directive was discussed and I am comfortable with the patient s wishes.  Patient Education   Personalized Prevention Plan  You are due for the preventive services outlined below.  Your care team is available to assist you in scheduling these services.  If you have already completed any of these items, please share that information with your care team to update in your medical record.  Health Maintenance   Topic Date Due     HEPATITIS C SCREENING  1955     HIV SCREENING  03/28/1969     ADVANCE CARE PLANNING  05/05/2011     ZOSTER VACCINES (2 of 3) 12/15/2015     MEDICARE ANNUAL WELLNESS VISIT  03/28/2019     DXA SCAN  03/28/2019     PNEUMOCOCCAL POLYSACCHARIDE VACCINE AGE 65 AND OVER  03/28/2019     PNEUMOCOCCAL CONJUGATE VACCINE FOR ADULTS (PCV13 OR PREVNAR)  03/28/2019     FALL RISK ASSESSMENT  03/28/2019     INFLUENZA VACCINE RULE BASED (1) 10/01/2019 (Originally 8/1/2019)     MAMMOGRAM  06/04/2021     COLONOSCOPY  11/21/2023     TD 18+ HE  11/11/2025     TDAP ADULT ONE TIME DOSE  Completed

## 2021-06-01 NOTE — TELEPHONE ENCOUNTER
Test Results  Who is calling?:  Patient  Who ordered the test:  Dr. Myles   Type of test: Lab  Date of test:  9/16/19  Where was the test performed:  [place]  What are your questions/concerns?:  Patient is asking if she should be concerned about her higher lipids. Patient felt they were a result of taking Seroquel. Please reach out to patient and advise. Patient is also requesting a copy of her results mailed to her.  Okay to leave a detailed message?:  Yes

## 2021-06-01 NOTE — TELEPHONE ENCOUNTER
Your hemoglobin is normal as are your electrolytes, kidneys and liver tests.     Your cholesterol is up quite a bit from last time it was checked. It is still well under treatment range at this time, but continue to watch your diet and work on exercise. It certainly could be partially from the Seroquel, which you can discuss with psychiatry. We recommend treatment when the LDL is over 170 or your risk of a heart attack is more than 7.5% in 10 years and you are at 4%.     Your pap smear was normal and your HPV testing was negative, we likely do not need to do anymore pap smears at this time.

## 2021-06-01 NOTE — PROGRESS NOTES
Assessment:     1. Routine general medical examination at a health care facility    2. Postoperative anemia    3. Asymptomatic menopausal state    4. Skin lesion    5. Lipid screening    6. Screening for diabetes mellitus    7. Screening for malignant neoplasm of cervix    8. Immunization due        Plan:        1. Routine general medical examination at a health care facility  Routine health maintenance discussion:  No smoking, limited alcohol (7 or less servings per week), 5 fruits/veg servings per day, 200 minutes of exercise per week.  Daily calcium/vitamin D guidelines, bone health, colon cancer screening beginning at age 50.  Accident avoidance, sun screen.   -Patient did defer any HIV and hepatitis C screening today, she is low risk.    2. Postoperative anemia  -Did have postoperative anemia, updating hemoglobin to make sure this has resolved.  - HM2(CBC w/o Differential)    3. Asymptomatic menopausal state  -Generally doing well from a post menopause perspective, I did order her first screening bone density scan to ensure things look to be normal, particularly with her normal BMI.  - DXA Bone Density Scan; Future    4. Skin lesion  -Patient has had numerous perceived actinic keratoses frozen off over the years, given this I am referring her to dermatology for further evaluation and assessment and management which she is comfortable with.  - Ambulatory referral to Dermatology    5. Lipid screening  - Lipid [place]    6. Screening for diabetes mellitus  - Basic Metabolic Panel    7. Screening for malignant neoplasm of cervix  -Normal today on exam, discussed if normal Pap smear she likely will not need any further Pap smears.  - Gynecologic Cytology (PAP Smear)  - HPV High Risk DNA Cervical    8. Immunization due  - Pneumococcal conjugate vaccine 13-valent 6wks-17yrs; >50yrs       Subjective:      Nandini Yang is a 64 y.o. female who presents for an annual exam. The patient is sexually active. The patient  participates in regular exercise: yes. The patient reports that there is not domestic violence in her life.     She otherwise is doing quite well.  She does have several skin lesions she would like me to look at today.  She has had many things frozen over the years by her prior primary care provider and me.  She did see a dermatologist remotely as well and does have a history of actinic keratoses.    Healthy Habits:   Regular Exercise: Yes  Sunscreen Use: Yes  Healthy Diet: Yes  Dental Visits Regularly: Yes  Seat Belt: Yes  Sexually active: Yes  Self Breast Exam Monthly:No  Hemoccults: N/A  Flex Sig: N/A  Colonoscopy: N/A, did cologuard  Lipid Profile: Yes  Glucose Screen: Yes  Prevention of Osteoporosis: Yes  Last Dexa: N/A      Immunization History   Administered Date(s) Administered     DT (pediatric) 2006     Td,adult,historic,unspecified 2006     Tdap 2015     ZOSTER, LIVE 10/20/2015     Immunization status: missing doses of pneumonia.    No exam data present    Gynecologic History  No LMP recorded. Patient is postmenopausal.  Contraception: post menopausal status  Last Pap: Unsure. Results were: normal  Last mammogram: 2019. Results were: normal      OB History    Para Term  AB Living   3 3 3         SAB TAB Ectopic Multiple Live Births                  # Outcome Date GA Lbr Angel/2nd Weight Sex Delivery Anes PTL Lv   3 Term            2 Term            1 Term                Current Outpatient Medications   Medication Sig Dispense Refill     fluticasone (FLONASE) 50 mcg/actuation nasal spray 1 spray into each nostril daily as needed .             hydrOXYzine pamoate (VISTARIL) 50 MG capsule TAKE ONE CAPSULE BY MOUTH TWICE A DAY AS NEEDED FOR ANXIETY  3     NON FORMULARY Take 1 tablet by mouth daily [place] laxative from [employer]- pt unsure of name .       QUEtiapine (SEROQUEL XR) 200 MG 24 hr tablet Take 200 mg by mouth at bedtime .             clonazePAM (KLONOPIN) 1 MG tablet  Take 1 mg by mouth as needed.        ibuprofen (ADVIL,MOTRIN) 600 MG tablet Take 1 tablet (600 mg total) by mouth every 6 (six) hours. 30 tablet 0     No current facility-administered medications for this visit.      Past Medical History:   Diagnosis Date     Anxiety      Constipation      Restless leg syndrome      Past Surgical History:   Procedure Laterality Date     ANTERIOR AND POSTERIOR VAGINAL REPAIR       BREAST BIOPSY Right 1999     CYSTOSCOPY N/A 12/4/2018    Procedure: CYSTOSCOPY;  Surgeon: Valery Bland MD;  Location: [facility] [place];  Service: Gynecology     LAPAROSCOPIC SUPRACERVICAL HYSTERECTOMY N/A 12/4/2018    Procedure: SUPRACERVICAL HYSTERECTOMY WITH BILATERAL SALPINGO-OOPHORECTOMY, LAPAROSCOPIC SACROCOLPOPEXY, CYSTOSCOPY;  Surgeon: Valery Bland MD;  Location: [facility] [place];  Service: Gynecology     Patient has no known allergies.  Family History   Problem Relation Age of Onset     Depression Mother      Tremor Mother      Cancer Mother         ? colon     Heart disease Father 80     Stroke Father 84     Other Sister         colectomy     Other Sister         Hysterectomy     Fibromyalgia Sister      Tremor Sister      Social History     Socioeconomic History     Marital status:      Spouse name: Not on file     Number of children: Not on file     Years of education: Not on file     Highest education level: Not on file   Occupational History     Not on file   Social Needs     Financial resource strain: Not on file     Food insecurity:     Worry: Not on file     Inability: Not on file     Transportation needs:     Medical: Not on file     Non-medical: Not on file   Tobacco Use     Smoking status: Never Smoker     Smokeless tobacco: Never Used   Substance and Sexual Activity     Alcohol use: No     Frequency: Never     Drug use: No     Sexual activity: Not Currently   Lifestyle     Physical activity:     Days per week: Not on file     Minutes per session:  "Not on file     Stress: Not on file   Relationships     Social connections:     Talks on phone: Not on file     Gets together: Not on file     Attends Synagogue service: Not on file     Active member of club or organization: Not on file     Attends meetings of clubs or organizations: Not on file     Relationship status: Not on file     Intimate partner violence:     Fear of current or ex partner: Not on file     Emotionally abused: Not on file     Physically abused: Not on file     Forced sexual activity: Not on file   Other Topics Concern     Not on file   Social History Narrative     Not on file       Review of Systems  Review of Systems      With the exception of the aforementioned issues, 12 point, comprehensive ROS was done and was negative.     Objective:         Vitals:    09/16/19 0906   BP: 112/70   Pulse: 76   Temp: 98.2  F (36.8  C)   TempSrc: Oral   SpO2: 99%   Weight: 145 lb (65.8 kg)   Height: 5' 6.5\" (1.689 m)     Body mass index is 23.05 kg/m .    Physical  Physical Exam     Gen: Well developed, well nourished, no acute distress.  HEENT: normocephalic/atraumatic, PERRLA/EOMI, TMs: Gray, normal light reflex, no nasal discharge.  Oral mucosa: no erythema/exudate  Neck: No LAD/masses/thyromegaly  Lungs: clear bilaterally  Heart: regular rate and rhythm, no murmurs/gallops/rubs  Breasts: symmetric, no masses/skin changes, nipple discharge, or axillary LAD.  BSE reviewed.  Abdomen: Normal bowel sounds, soft, non-tender, non-distended, no masses, neg Herrera's/McBurney's, no rebound/guarding  Genital: Normal external genitalia, no discharge, no lesions, cervix is non-friable, os is closed, no CMT, no adnexal tenderness or fullness.  Uterus is not enlarged, perineum intact.  Thin prep done.  Lymphatics: no supraclavicular/axillary LAD. No edema.  Neuro: A&O x 3, CN II-XII intact, strength 5/5, reflexes symmetric, sensory intact to light touch.  Psych: Behavior appropriate, engaging.  Thought processes " congruent, non-tangential.  Musculoskeletal: no gross deformities.  Skin: no rashes or lesions.

## 2021-06-02 ENCOUNTER — RECORDS - HEALTHEAST (OUTPATIENT)
Dept: ADMINISTRATIVE | Facility: CLINIC | Age: 66
End: 2021-06-02

## 2021-06-02 VITALS — HEIGHT: 67 IN | WEIGHT: 144.7 LBS | BODY MASS INDEX: 22.71 KG/M2

## 2021-06-02 VITALS — HEIGHT: 67 IN | BODY MASS INDEX: 22.52 KG/M2 | WEIGHT: 143.5 LBS

## 2021-06-03 VITALS
HEIGHT: 67 IN | TEMPERATURE: 98.2 F | HEART RATE: 76 BPM | DIASTOLIC BLOOD PRESSURE: 70 MMHG | WEIGHT: 145 LBS | BODY MASS INDEX: 22.76 KG/M2 | OXYGEN SATURATION: 99 % | SYSTOLIC BLOOD PRESSURE: 112 MMHG

## 2021-06-03 NOTE — TELEPHONE ENCOUNTER
Test Results  Who is calling?:  Patient   Who ordered the test:  Faiza Myles MD  Type of test: Other: DXA Bone Density Scan  Date of test:  10/28/19  Where was the test performed:  [facility]   What are your questions/concerns?:Patient requested for results writer relayed below message   Okay to leave a detailed message?:  No  Dear Ms. Yang,    Below are the results from your recent visit:  Your bone density scan showed that you have some thinning but no osteoporosis yet. Please aim to ingest about 1200 mg of calcium daily with food as well as supplements. We'll plan on repeating the scan in 2 years.   Please call with questions or contact us using [email or web address].    Sincerely,  Electronically signed by Faiza Myles MD

## 2021-06-05 VITALS
HEART RATE: 88 BPM | SYSTOLIC BLOOD PRESSURE: 108 MMHG | DIASTOLIC BLOOD PRESSURE: 62 MMHG | OXYGEN SATURATION: 100 % | BODY MASS INDEX: 22.47 KG/M2 | WEIGHT: 143.2 LBS | HEIGHT: 67 IN

## 2021-06-08 NOTE — PROGRESS NOTES
ASSESSMENT/PLAN:       1. Overactive bladder  -Some relief with the medication addition but still going frequently at nighttime.  We discussed limiting fluids at nighttime as I suspect this contributes to her symptoms.  We will try increasing her medication up to 10 mg orally daily, and if this is going well she will follow-up in 6 months.  - oxybutynin (DITROPAN XL) 10 MG ER tablet; Take 1 tablet (10 mg total) by mouth daily.  Dispense: 30 tablet; Refill: 5    2. Actinic keratosis  -Treated today per the procedure note.  Follow-up as needed.        Faiza Myles MD      PROGRESS NOTE   2/2/2017    SUBJECTIVE:  Nandini Yang is a 61 y.o. female  who presents for follow up.     She has some skin lesions on her neck. She has some spots on her neck that she is hoping to have frozen. They have been there a long time. They are not itching, but they are bothersome. They do not get caught on things.  Because of her issues with previous actinic keratoses and make her nervous to just continue to watch.    The oxybutynin works for her. She does get up every 45 minutes or so at times other times things aren't so bad.  She does have a mildly dry mouth but has discontinued the Remeron which has helped some.  She did see her obstetrician last year to see if her bladder had dropped, she had a bladder repair by Dr. Jones several years ago.  She has a sheet somewhere with pelvic exercises that she can do, she hasn't been doing them right now. They didn't think that anything had dropped at that time.       Chief Complaint   Patient presents with     Skin/SubQ Lesion     Patient would like to discuss her skin skin lesions around her neck.      Medication Management     Patient would like to discuss medications used for bladder control.          Patient Active Problem List   Diagnosis     Seborrheic Keratosis     Generalized Anxiety Disorder     Insomnia     Foot Sprain     Herpes Simplex Type I     Actinic Keratosis      Abdominal Pain     Restless Legs Syndrome     Vitamin D Deficiency     Constipation     Joint Pain, Localized In The Hip     Dizziness     Pruritus ani     Anxiety     Irritable bowel     Hiatal hernia       Current Outpatient Prescriptions   Medication Sig Dispense Refill     clonazePAM (KLONOPIN) 1 MG tablet Take 1 mg by mouth 2 (two) times a day.       fluticasone (FLONASE) 50 mcg/actuation nasal spray 1 spray into each nostril daily.       omeprazole (PRILOSEC) 40 MG capsule Take 1 capsule (40 mg total) by mouth daily. 30 capsule 5     ospemifene (OSPHENA) 60 mg Tab        oxybutynin (DITROPAN XL) 10 MG ER tablet Take 1 tablet (10 mg total) by mouth daily. 30 tablet 5     PARoxetine (PAXIL) 40 MG tablet Take 1 tablet (40 mg total) by mouth every morning. 90 tablet 0     QUEtiapine (SEROQUEL XR) 200 MG 24 hr tablet        rOPINIRole (REQUIP) 0.25 MG tablet Take 1 tablet (0.25 mg total) by mouth bedtime. 30 tablet 2     simethicone (MYLICON) 80 MG chewable tablet Chew 1 tablet (80 mg total) every 6 (six) hours as needed for flatulence. 100 tablet 3     TRIAMCINOLONE ACETONIDE (NASACORT NASL) into each nostril.       zolpidem (AMBIEN CR) 12.5 MG CR tablet TAKE 1 TABLET BY MOUTH AT BEDTIME AS NEEDED  3     No current facility-administered medications for this visit.        History   Smoking Status     Never Smoker   Smokeless Tobacco     Never Used           OBJECTIVE:        No results found for this or any previous visit (from the past 240 hour(s)).    Vitals:    02/02/17 0814   BP: 102/60   Patient Site: Right Arm   Patient Position: Sitting   Cuff Size: Adult Regular   Pulse: 70     No Data Recorded        Physical Exam:  GENERAL APPEARANCE: A&A, NAD, well hydrated, well nourished  SKIN:  Normal skin turgor, 3 lesions on her neck one on the right and 2 on the left that are slightly scaly and raised without erythema.  One that is slightly darker in color and has more of a stuck on appearance and is rougher, this  is on the right   HEENT: moist mucous membranes, no rhinorrhea  NECK: Normal  CV: RRR, no M/G/R   LUNGS: CTAB  NEURO: no gross deficits   Psych: Her affect is slightly anxious, fair eye contact, thought process and speech pattern are normal

## 2021-06-08 NOTE — PROGRESS NOTES
Cryotherapy, skin lesion  Date/Time: 2/2/2017 8:20 AM  Performed by: VIVIENNE HERNANDEZ  Authorized by: VIVIENNE HERNANDEZ   Consent: Verbal consent obtained.  Risks and benefits: risks, benefits and alternatives were discussed  Consent given by: patient  Patient understanding: patient states understanding of the procedure being performed  Patient consent: the patient's understanding of the procedure matches consent given  Procedure consent: procedure consent matches procedure scheduled  Site marked: the operative site was marked  Patient identity confirmed: verbally with patient  Local anesthesia used: no    Anesthesia:  Local anesthesia used: no  Sedation:  Patient sedated: no    Patient tolerance: Patient tolerated the procedure well with no immediate complications        Procedure note:    Consent: Risks and benefits of therapy discussed with patient who voices understanding and agrees with planned care. No barriers to communication or understanding identified.  After obtaining informed consent, the patient's identity, procedure, and site were verified during a pause prior to proceeding with the minor surgical procedure as per universal protocol recommendations.  Actinic keratoses were treated with cryocautery with freeze thaw freeze technique with 2-3 mm surround freeze. Local care discussed. Side effects of treatment and precautions discussed. All questions answered. To follow up if worse or any new problems

## 2021-06-11 NOTE — TELEPHONE ENCOUNTER
Test Results  Who is calling?:  patient  Who ordered the test:  Dr Myles  Type of test: Lab  Date of test:  9/22/2020  Where was the test performed:  In clinic  What are your questions/concerns?:  Patient asking for results. Writer read the follow to patient per Dr Myles letter dated 9/22/2020:Your electrolytes, kidneys and liver tests are normal. Your cholesterol has improved as well. Keep up the good work.      Nandini did ask for some readings directly.  No further questions.  Okay to leave a detailed message?:  No

## 2021-06-11 NOTE — TELEPHONE ENCOUNTER
Test Results  Who is calling?:  Patient  Who ordered the test:  Faiza Myles MD   Type of test: Lab  Date of test:  9/18/20  Where was the test performed:  [facility]  What are your questions/concerns?:  Patient stated she does not want to wait for her results to be sent to her in the mail. Patient stated she would like to  a copy of her results from [facility].  Okay to leave a detailed message?:  No

## 2021-06-11 NOTE — PROGRESS NOTES
Assessment and Plan:   1. Routine general medical examination at a health care facility  -Doing well at this time.   -Routine health maintenance discussion:  No smoking, limited alcohol (7 or less servings per week), 5 fruits/veg servings per day, 200 minutes of exercise per week.  Daily calcium/vitamin D guidelines, bone health, colon cancer screening beginning at age 50.  Accident avoidance, sun screen.     2. Other insomnia  -Started on Lunesta for short period of time. If needing for long term she will need to follow up in 3-6 months.     3. Lipid screening  - Lipid [place] FASTING    4. Screening for diabetes mellitus  - Comprehensive Metabolic Panel    5. Immunization due  - Pneumococcal polysaccharide vaccine 23-valent 1 yo or older, subq/IM     Patient has been advised of split billing requirements and indicates understanding: Yes      The patient's current medical problems were reviewed.      The following health maintenance schedule was reviewed with the patient and provided in printed form in the after visit summary:   Health Maintenance   Topic Date Due     ZOSTER VACCINES (2 of 3) 12/15/2015     INFLUENZA VACCINE RULE BASED (1) 08/01/2020     PNEUMOCOCCAL IMMUNIZATION 65+ LOW/MEDIUM RISK (2 of 2 - PPSV23) 09/16/2020     MEDICARE ANNUAL WELLNESS VISIT  09/18/2021     FALL RISK ASSESSMENT  09/18/2021     DXA SCAN  10/28/2021     MAMMOGRAM  09/08/2022     COLORECTAL CANCER SCREENING  11/21/2023     LIPID  09/16/2024     ADVANCE CARE PLANNING  09/16/2024     TD 18+ HE  11/11/2025     HEPATITIS B VACCINES  Aged Out     HEPATITIS C SCREENING  Discontinued        Subjective:   Chief Complaint: Nandini Yang is an 65 y.o. female here for an Annual Wellness visit.   HPI:  HPI:  She is feeling well. She does have a wellness form that needs to be filled out.     She has been having some issues falling asleep and staying asleep. She does wonder about Ambien to help with sleep. She has done melatonin in the past  and this did cause significant cotton mouth. She is seeing a therapist as well, Rita Hunt at [facility] in [place].     She does have some spots that she would like to have looked at as well, typically irritated SKs.     Review of Systems:   Please see above.  The rest of the review of systems are negative for all systems.    Patient Care Team:  Faiza Myles MD as PCP - General (Family Medicine)  Faiza Myles MD as Assigned PCP     Patient Active Problem List   Diagnosis     Seborrheic Keratosis     Generalized Anxiety Disorder     Insomnia     Herpes Simplex Type I     Actinic Keratosis     Abdominal Pain     Restless Legs Syndrome     Vitamin D Deficiency     Constipation     Joint Pain, Localized In The Hip     Dizziness     Pruritus ani     Anxiety     Irritable bowel     Hiatal hernia     Increased urinary frequency     Uterovaginal prolapse, complete     Past Medical History:   Diagnosis Date     Anxiety      Constipation      Restless leg syndrome       Past Surgical History:   Procedure Laterality Date     ANTERIOR AND POSTERIOR VAGINAL REPAIR       BREAST BIOPSY Right 1999     CYSTOSCOPY N/A 12/4/2018    Procedure: CYSTOSCOPY;  Surgeon: Valery Bland MD;  Location: [facility] OR;  Service: Gynecology     LAPAROSCOPIC SUPRACERVICAL HYSTERECTOMY N/A 12/4/2018    Procedure: SUPRACERVICAL HYSTERECTOMY WITH BILATERAL SALPINGO-OOPHORECTOMY, LAPAROSCOPIC SACROCOLPOPEXY, CYSTOSCOPY;  Surgeon: Valery Bland MD;  Location: [facility] OR;  Service: Gynecology      Family History   Problem Relation Age of Onset     Depression Mother      Tremor Mother      Cancer Mother         ? colon     Heart disease Father 80     Stroke Father 84     Other Sister         colectomy     Other Sister         Hysterectomy     Fibromyalgia Sister      Tremor Sister       Social History     Socioeconomic History     Marital status:      Spouse name: Not on file      Number of children: Not on file     Years of education: Not on file     Highest education level: Not on file   Occupational History     Not on file   Social Needs     Financial resource strain: Not on file     Food insecurity     Worry: Not on file     Inability: Not on file     Transportation needs     Medical: Not on file     Non-medical: Not on file   Tobacco Use     Smoking status: Never Smoker     Smokeless tobacco: Never Used   Substance and Sexual Activity     Alcohol use: No     Frequency: Never     Drug use: No     Sexual activity: Not Currently     Birth control/protection: Surgical   Lifestyle     Physical activity     Days per week: Not on file     Minutes per session: Not on file     Stress: Not on file   Relationships     Social connections     Talks on phone: Not on file     Gets together: Not on file     Attends Anabaptist service: Not on file     Active member of club or organization: Not on file     Attends meetings of clubs or organizations: Not on file     Relationship status: Not on file     Intimate partner violence     Fear of current or ex partner: Not on file     Emotionally abused: Not on file     Physically abused: Not on file     Forced sexual activity: Not on file   Other Topics Concern     Not on file   Social History Narrative     Not on file      Current Outpatient Medications   Medication Sig Dispense Refill     clonazePAM (KLONOPIN) 1 MG tablet Take 1 mg by mouth as needed.        hydrOXYzine pamoate (VISTARIL) 50 MG capsule 50 mg as needed.   3     multivitamin with iron (HAIR VITAMINS ORAL) Take by mouth daily.       QUEtiapine (SEROQUEL XR) 150 mg Tb24 24 hr tablet TAKE 1 TABLET BY MOUTH EVERYDAY AT BEDTIME       eszopiclone (LUNESTA) 1 MG Tab Take 1-2 tablets (1-2 mg total) by mouth at bedtime as needed. Take immediately before bedtime 30 tablet 1     fluticasone (FLONASE) 50 mcg/actuation nasal spray 1 spray into each nostril daily as needed .             ibuprofen  "(ADVIL,MOTRIN) 600 MG tablet Take 1 tablet (600 mg total) by mouth every 6 (six) hours. 30 tablet 0     NON FORMULARY Take 1 tablet by mouth daily [place] laxative from [employer]- pt unsure of name .       No current facility-administered medications for this visit.       Objective:   Vital Signs:   Visit Vitals  /62 (Patient Site: Right Arm, Patient Position: Sitting, Cuff Size: Adult Regular)   Pulse 88   Ht 5' 7\" (1.702 m)   Wt 143 lb 3.2 oz (65 kg)   SpO2 100%   Breastfeeding No   BMI 22.43 kg/m           VisionScreening:  No exam data present     PHYSICAL EXAM  Gen: Well developed, well nourished, no acute distress.  HEENT: normocephalic/atraumatic, PERRLA/EOMI, TMs: Gray, normal light reflex, no nasal discharge.  Oral mucosa: no erythema/exudate  Neck: No LAD/masses/thyromegaly  Lungs: clear bilaterally  Heart: regular rate and rhythm, no murmurs/gallops/rubs  Breasts: symmetric, no masses/skin changes, nipple discharge, or axillary LAD.  BSE reviewed.  Abdomen: Normal bowel sounds, soft, non-tender, non-distended, no masses, neg Herrera's/McBurney's, no rebound/guarding  Genital: deferred, no complaints  Lymphatics: no supraclavicular/axillary/epitrochlear/inguinal LAD. No edema.  Neuro: A&O x 3, CN II-XII intact, strength 5/5, reflexes symmetric, sensory intact to light touch.  Psych: Behavior appropriate, engaging.  Thought processes congruent, non-tangential. Eye contact normal  Musculoskeletal: no gross deformities.  Skin: no rashes or lesions.       Assessment Results 9/18/2020   Activities of Daily Living No help needed   Instrumental Activities of Daily Living No help needed   Get Up and Go Score Less than 12 seconds   Mini Cog Total Score 5   Some recent data might be hidden     A Mini-Cog score of 0-2 suggests the possibility of dementia, score of 3-5 suggests no dementia      Identified Health Risks:     Information regarding advance directives (living bray), including where she can download the " appropriate form, was provided to the patient via the AVS.

## 2021-06-11 NOTE — PROGRESS NOTES
"CS Destruction Benign Skin Lesions    Date/Time: 9/18/2020 8:30 AM  Performed by: Faiza Myles MD  Authorized by: Faiza Myles MD   Consent: Verbal consent obtained.  Risks and benefits: risks, benefits and alternatives were discussed  Consent given by: patient  Patient understanding: patient states understanding of the procedure being performed  Patient consent: the patient's understanding of the procedure matches consent given  Procedure consent: procedure consent matches procedure scheduled  Site marked: the operative site was marked  Required items: required blood products, implants, devices, and special equipment available  Patient identity confirmed: verbally with patient  Time out: Immediately prior to procedure a \"time out\" was called to verify the correct patient, procedure, equipment, support staff and site/side marked as required.  Preparation: Patient was prepped and draped in the usual sterile fashion.  Local anesthesia used: no    Anesthesia:  Local anesthesia used: no    Sedation:  Patient sedated: no    Patient tolerance: Patient tolerated the procedure well with no immediate complications        Procedure note:    Consent: Risks and benefits of therapy discussed with patient who voices understanding and agrees with planned care. No barriers to communication or understanding identified.  After obtaining informed consent, the patient's identity, procedure, and site were verified during a pause prior to proceeding with the minor surgical procedure as per universal protocol recommendations.  11 Seborrheic keratoses were treated with cryocautery with freeze thaw freeze technique with 2-3 mm surround freeze. Local care discussed. Side effects of treatment and precautions discussed. All questions answered. To follow up if worse or any new problems      "

## 2021-06-11 NOTE — PROGRESS NOTES
ASSESSMENT/PLAN:     The following are part of a depression follow up plan for the patient:  coping support assessment and emotional support assessment    1. Chronic idiopathic constipation    - linaclotide (LINZESS) 145 mcg cap capsule; Take 1 capsule (145 mcg total) by mouth daily.  Dispense: 30 capsule; Refill: 5  Previously prescribed this by Dr. Maurice Irvin. Patient has not been taking this and constipation has gotten worse which may explain her increased urinary frequency today.    2. Increased urinary frequency  Continue taking Oxybutynin 10 mg daily. Patient afebrile today so no need for urinalysis. Denies burning sensation, painful urination or CVS tenderness.    Patient instructed to follow up with her PCP if urinary frequency is not improved after re-starting the Linzess for her constipation issue. Explained to patient that she needs to perform a good bowel cleansing this weekend since it is a holiday weekend. Once this is completed, we will be able to see if this has been the cause of there bladder issues.     The visit lasted a total of 20 minutes face to face with the patient.  Over 50% of the time spent counseling and educating the patient about the above content.           SUBJECTIVE:  Nandini Yang is a 62 y.o. female that presents today reporting ongoing overactive bladder issues and has been worse at night for the last 2 months. She has been taking her oxybutynin 10 mg daily as prescribed by Dr. Myles. She has a history of bladder repair surgery and was evaluated recently by her OB/GYN because she felt that maybe her bladder was prolapsing.  The OB/GYN confirm that her bladder was not prolapsing.  During her last visit with Dr. Myles, they discussed reducing the amount of fluids that she drinks prior to going to bed.  She has been limiting her intake. She does continue to drink caffeine every other day.  Patient has been struggling with constipation for the last 4 days.  She does have a history  of this and was given a prescription by Dr. Maurice Irvin last April for Linzess.  This prescription did run out and she has not been able to get it refilled and is asking for this today.  She is due for her next Pap with her OB/GYN on August 2.  She will be having additional blood work performed for her annual exam.  She states that her weight has been stable over the last year.    Chief Complaint   Patient presents with     Overactive Bladder     frequent urination at night         Patient Active Problem List   Diagnosis     Seborrheic Keratosis     Generalized Anxiety Disorder     Insomnia     Foot Sprain     Herpes Simplex Type I     Actinic Keratosis     Abdominal Pain     Restless Legs Syndrome     Vitamin D Deficiency     Constipation     Joint Pain, Localized In The Hip     Dizziness     Pruritus ani     Anxiety     Irritable bowel     Hiatal hernia     Increased urinary frequency       Current Outpatient Prescriptions   Medication Sig Dispense Refill     clonazePAM (KLONOPIN) 1 MG tablet Take 1 mg by mouth once.        fluticasone (FLONASE) 50 mcg/actuation nasal spray 1 spray into each nostril daily.       omeprazole (PRILOSEC) 40 MG capsule Take 1 capsule (40 mg total) by mouth daily. 30 capsule 5     ospemifene (OSPHENA) 60 mg Tab        oxybutynin (DITROPAN XL) 10 MG ER tablet Take 1 tablet (10 mg total) by mouth daily. 30 tablet 5     PARoxetine (PAXIL) 40 MG tablet Take 1 tablet (40 mg total) by mouth every morning. 90 tablet 0     QUEtiapine (SEROQUEL XR) 200 MG 24 hr tablet        rOPINIRole (REQUIP) 0.25 MG tablet Take 1 tablet (0.25 mg total) by mouth bedtime. 30 tablet 2     simethicone (MYLICON) 80 MG chewable tablet Chew 1 tablet (80 mg total) every 6 (six) hours as needed for flatulence. 100 tablet 3     TRIAMCINOLONE ACETONIDE (NASACORT NASL) into each nostril.       zolpidem (AMBIEN CR) 12.5 MG CR tablet TAKE 1 TABLET BY MOUTH AT BEDTIME AS NEEDED  3     linaclotide (LINZESS) 145 mcg cap  "capsule Take 1 capsule (145 mcg total) by mouth daily. 30 capsule 5     No current facility-administered medications for this visit.        History   Smoking Status     Never Smoker   Smokeless Tobacco     Never Used       REVIEW OF SYSTEMS: Denies dysuria, frequency, urgency, fevers, chills, back pain, nausea, vomiting or abdominal pain.      TOBACCO USE:  History   Smoking Status     Never Smoker   Smokeless Tobacco     Never Used     Social History     Social History     Marital status:      Spouse name: N/A     Number of children: N/A     Years of education: N/A     Occupational History     Not on file.     Social History Main Topics     Smoking status: Never Smoker     Smokeless tobacco: Never Used     Alcohol use Not on file     Drug use: Not on file     Sexual activity: Not on file     Other Topics Concern     Not on file     Social History Narrative           Objective:         /72  Pulse 86  Temp 97.6  F (36.4  C) (Oral)   Resp 16  Ht 5' 7.5\" (1.715 m)  Wt 162 lb (73.5 kg)  Breastfeeding? No  BMI 25 kg/m2     Physical Exam:  General Appearance: Alert, cooperative, no distress, appears stated age  Neck: Supple, symmetrical, trachea midline, no adenopathy;  thyroid: not enlarged, symmetric, no tenderness/mass/nodules; no carotid bruit or JVD  Back: Symmetric, no curvature, ROM normal, no CVA tenderness  Lungs: Clear to auscultation bilaterally, respirations unlabored  Heart: Regular rate and rhythm, S1 and S2 normal, no murmur, rub, or gallop  Abdomen: Soft, non-tender, bowel sounds active all four quadrants,  no masses, no organomegaly       Avril Weber NP               "

## 2021-06-11 NOTE — PROGRESS NOTES
ASSESSMENT/PLAN:       1. Inflamed seborrheic keratosis  -Frozen today without difficulty. Will return to clinic as needed for repeat freezing. Will call if other concerns    2. Insomnia, unspecified type  - eszopiclone (LUNESTA) 1 MG Tab; Take 1-2 tablets (1-2 mg total) by mouth at bedtime as needed. Take immediately before bedtime  Dispense: 30 tablet; Refill: 1        Faiza Myles MD      PROGRESS NOTE   9/18/2020    SUBJECTIVE:  Nandini Yang is a 65 y.o. female  who presents for skin lesions.     The patient does have a history of recurrent seborrheic keratoses that will get irritated and inflamed. She does have some on her right neck and under he breast that get irritated due to clothing as well as a few on her right hand.     Chief Complaint   Patient presents with     Procedure     Removal of keratosis on right hand, chest / neck region and under the breasts.          Patient Active Problem List   Diagnosis     Seborrheic Keratosis     Generalized Anxiety Disorder     Insomnia     Herpes Simplex Type I     Actinic Keratosis     Abdominal Pain     Restless Legs Syndrome     Vitamin D Deficiency     Constipation     Joint Pain, Localized In The Hip     Dizziness     Pruritus ani     Anxiety     Irritable bowel     Hiatal hernia     Increased urinary frequency     Uterovaginal prolapse, complete       Current Outpatient Medications   Medication Sig Dispense Refill     clonazePAM (KLONOPIN) 1 MG tablet Take 1 mg by mouth as needed.        eszopiclone (LUNESTA) 1 MG Tab Take 1-2 tablets (1-2 mg total) by mouth at bedtime as needed. Take immediately before bedtime 30 tablet 1     fluticasone (FLONASE) 50 mcg/actuation nasal spray 1 spray into each nostril daily as needed .             hydrOXYzine pamoate (VISTARIL) 50 MG capsule 50 mg as needed.   3     multivitamin with iron (HAIR VITAMINS ORAL) Take by mouth daily.       NON FORMULARY Take 1 tablet by mouth daily [place] laxative from [employer]- pt  unsure of name .       QUEtiapine (SEROQUEL XR) 150 mg Tb24 24 hr tablet TAKE 1 TABLET BY MOUTH EVERYDAY AT BEDTIME       No current facility-administered medications for this visit.        Social History     Tobacco Use   Smoking Status Never Smoker   Smokeless Tobacco Never Used           OBJECTIVE:        Recent Results (from the past 240 hour(s))   Lipid [place] FASTING   Result Value Ref Range    Cholesterol 235 (H) <=199 mg/dL    Triglycerides 106 <=149 mg/dL    HDL Cholesterol 78 >=50 mg/dL    LDL Calculated 136 (H) <=129 mg/dL    Patient Fasting > 8hrs? Yes    Comprehensive Metabolic Panel   Result Value Ref Range    Sodium 140 136 - 145 mmol/L    Potassium 4.5 3.5 - 5.0 mmol/L    Chloride 105 98 - 107 mmol/L    CO2 26 22 - 31 mmol/L    Anion Gap, Calculation 9 5 - 18 mmol/L    Glucose 94 70 - 125 mg/dL    BUN 15 8 - 22 mg/dL    Creatinine 0.77 0.60 - 1.10 mg/dL    GFR MDRD Af Amer >60 >60 mL/min/1.73m2    GFR MDRD Non Af Amer >60 >60 mL/min/1.73m2    Bilirubin, Total 0.9 0.0 - 1.0 mg/dL    Calcium 9.4 8.5 - 10.5 mg/dL    Protein, Total 6.6 6.0 - 8.0 g/dL    Albumin 4.1 3.5 - 5.0 g/dL    Alkaline Phosphatase 77 45 - 120 U/L    AST 19 0 - 40 U/L    ALT 14 0 - 45 U/L       There were no vitals filed for this visit.  Weight: 143 lb 3.2 oz (65 kg)          Physical Exam:  GENERAL APPEARANCE: A&A, NAD, well hydrated, well nourished  SKIN:  Normal skin turgor, no lesions/rashes, scattered, raised SKs on her right neck, mid chest and under bilateral breasts and right hand. Four on her right neck, one mid chest, two under each breast and two on the dorsum of the right hand  EXTREMITY: no edema   NEURO: no gross deficits

## 2021-06-13 NOTE — TELEPHONE ENCOUNTER
Controlled Substance Refill Request  Medication Name:   Requested Prescriptions     Pending Prescriptions Disp Refills     eszopiclone (LUNESTA) 1 MG Tab [Pharmacy Med Name: ESZOPICLONE 1 MG TABLET] 30 tablet 1     Sig: TAKE 1-2 TABLETS (1-2 MG TOTAL) BY MOUTH AT BEDTIME AS NEEDED. TAKE IMMEDIATELY BEFORE BEDTIME     Date Last Fill: 9/18/20  Requested Pharmacy: [facility]  Submit electronically to pharmacy  Controlled Substance Agreement on file:   Encounter-Level CSA Scan Date:    There are no encounter-level csa scan date.        Last office visit:  9/18/20

## 2021-06-13 NOTE — TELEPHONE ENCOUNTER
"Who is calling:  The patient   Reason for Call:  The patient is checking on the status for the Lunesta refill. The patient states she has 5 days left and wants to know if it can be filled? The patient is requesting to this writer to  \"expedite\" this message.  Date of last appointment with primary care:   Okay to leave a detailed message: Yes      "

## 2021-06-15 NOTE — PROGRESS NOTES
ASSESSMENT/PLAN:       1. Skin lesions  -11 total skin lesions were frozen with typical cryotherapy without difficulty today.  Routine cares were discussed and she will follow-up as needed.  - Cryotherapy, skin lesion    Faiza Myles MD      PROGRESS NOTE   1/17/2018    SUBJECTIVE:  Nandini Yang is a 62 y.o. female  who presents for concerning skin lesions.    She does continue to have issues with her skin.  She has a long-standing history of actinic keratoses and seborrheic keratoses becoming irritated and bothersome.  She has had them frozen many times before.  She does have many spots on her neck, legs and arms like me to look at and that she has marked.  They become bothersome, irritated as does a skin tag in her right axilla.    Her goal is to take the seroquel at night, has had her ambien cut in half and is working on weaning. She is following with her psychiatrist.   Chief Complaint   Patient presents with     Procedure     Patient is here today to discuss removing her skin lesions around her neck, on her legs, arms and back.          Patient Active Problem List   Diagnosis     Seborrheic Keratosis     Generalized Anxiety Disorder     Insomnia     Foot Sprain     Herpes Simplex Type I     Actinic Keratosis     Abdominal Pain     Restless Legs Syndrome     Vitamin D Deficiency     Constipation     Joint Pain, Localized In The Hip     Dizziness     Pruritus ani     Anxiety     Irritable bowel     Hiatal hernia     Increased urinary frequency       Current Outpatient Prescriptions   Medication Sig Dispense Refill     clonazePAM (KLONOPIN) 1 MG tablet Take 1 mg by mouth once.        codeine-guaiFENesin (GUAIFENESIN AC)  mg/5 mL liquid Take 5 mL by mouth 2 (two) times a day as needed for cough. 240 mL 0     fluticasone (FLONASE) 50 mcg/actuation nasal spray 1 spray into each nostril daily.       QUEtiapine (SEROQUEL XR) 200 MG 24 hr tablet        rOPINIRole (REQUIP) 0.25 MG tablet Take 1  tablet (0.25 mg total) by mouth bedtime. 30 tablet 2     zolpidem (AMBIEN CR) 12.5 MG CR tablet TAKE 1 TABLET BY MOUTH AT BEDTIME AS NEEDED  3     linaclotide (LINZESS) 145 mcg cap capsule Take 1 capsule (145 mcg total) by mouth daily. 30 capsule 5     omeprazole (PRILOSEC) 40 MG capsule Take 1 capsule (40 mg total) by mouth daily. 30 capsule 5     ospemifene (OSPHENA) 60 mg Tab        oxybutynin (DITROPAN XL) 10 MG ER tablet Take 1 tablet (10 mg total) by mouth daily. 30 tablet 5     PARoxetine (PAXIL) 40 MG tablet Take 1 tablet (40 mg total) by mouth every morning. 90 tablet 0     simethicone (MYLICON) 80 MG chewable tablet Chew 1 tablet (80 mg total) every 6 (six) hours as needed for flatulence. 100 tablet 3     TRIAMCINOLONE ACETONIDE (NASACORT NASL) into each nostril.       No current facility-administered medications for this visit.        History   Smoking Status     Never Smoker   Smokeless Tobacco     Never Used           OBJECTIVE:        No results found for this or any previous visit (from the past 240 hour(s)).    Vitals:    01/17/18 0955   BP: 118/70   Patient Site: Right Arm   Patient Position: Sitting   Cuff Size: Adult Regular   Pulse: 78   SpO2: 98%     Weight: 152 lb (68.9 kg)        Physical Exam:  GENERAL APPEARANCE: A&A, NAD, well hydrated, well nourished  SKIN:  Normal skin turgor, for lesions on her neck both sides that are raised and rough consistent with seborrheic keratoses, 2 on her right thigh that are scaly consistent with actinic keratoses and one on her left knee that is scaly and raised like a seborrheic keratosis.  She has 3 lesions on her back consistent with seborrheic keratoses and one skin tag in her right axilla that at this time is not irritated but is quite small.  She otherwise has many scattered freckles  EXTREMITY: no edema   NEURO: no gross deficits

## 2021-06-15 NOTE — PROGRESS NOTES
"Cryotherapy, skin lesion  Date/Time: 1/17/2018 10:15 AM  Performed by: VIVIENNE HERNANDEZ  Authorized by: VIVIENNE HERNANDEZ   Consent: Verbal consent obtained.  Risks and benefits: risks, benefits and alternatives were discussed  Consent given by: patient  Patient understanding: patient states understanding of the procedure being performed  Patient consent: the patient's understanding of the procedure matches consent given  Procedure consent: procedure consent matches procedure scheduled  Site marked: the operative site was marked  Required items: required blood products, implants, devices, and special equipment available  Patient identity confirmed: verbally with patient  Time out: Immediately prior to procedure a \"time out\" was called to verify the correct patient, procedure, equipment, support staff and site/side marked as required.  Preparation: Patient was prepped and draped in the usual sterile fashion.  Local anesthesia used: no    Anesthesia:  Local anesthesia used: no    Sedation:  Patient sedated: no  Patient tolerance: Patient tolerated the procedure well with no immediate complications        Procedure note:    Consent: Risks and benefits of therapy discussed with patient who voices understanding and agrees with planned care. No barriers to communication or understanding identified.  After obtaining informed consent, the patient's identity, procedure, and site were verified during a pause prior to proceeding with the minor surgical procedure as per universal protocol recommendations.  Actinic keratoses, one skin tag and Seborrheic keratoses (11 total lesions) were treated with cryocautery with freeze thaw freeze technique with 2-3 mm surround freeze. Local care discussed. Side effects of treatment and precautions discussed. All questions answered. To follow up if worse or any new problems     "

## 2021-06-15 NOTE — PROGRESS NOTES
1. Cough  codeine-guaiFENesin (GUAIFENESIN AC)  mg/5 mL liquid   2. Seborrheic Keratosis  mupirocin (BACTROBAN) 2 % ointment     Med list reconciled    ASSESSMENT/PLAN:     Body Mass Index was not assessed due to normal normal BMI.    1. Cough    - codeine-guaiFENesin (GUAIFENESIN AC)  mg/5 mL liquid; Take 5 mL by mouth 2 (two) times a day as needed for cough.  Dispense: 240 mL; Refill: 0    2. Seborrheic Keratosis    - mupirocin (BACTROBAN) 2 % ointment; Apply to affected area 3 times daily  Dispense: 22 g; Refill: 0    Patient instructed to return to clinic if her cough persists or become severe in the next 7-10 days.  Patient requesting a refill on her Cheratussin cough syrup that she was previously prescribed by Dr. Maurice Irvin.  She is also requesting a refill of her topical Muproprion for her seborrheic keratosis.  Based on physical exam findings today, antibiotic therapy is not indicated.    The visit lasted a total of  minutes face to face with the patient.  Over 50% of the time spent counseling and educating the patient about above content.      Avril Weber NP          SUBJECTIVE:  Nandini Yang is a 62 y.o. female who presents for a cough that started 3 months ago.  She states the cough has been intermittent.  She describes her chest discomfort as a dull ache.  Aggravating factors: Laying flat.  Relieving factors: Nothing up to this point, she has been using over-the-counter cough syrup.  She is requesting a refill of her Chertytussin with codeine that was previously prescribed to her by Dr. Maurice Irvin.  She felt that the Cherrytussin worked very well for treating her cough.  She does report some ongoing chills with intermittent fevers.  She is rating her chest discomfort a 5 out of 10 today.  Patient is a non-smoker, she has no history of asthma.  She is denying any recent travel out of the country and has not been around anybody who has been ill recently.  She is afebrile today and  her vital signs are stable.  Patient did bring a list of concerns with her today, however, I did inform her that due to limited time constraint I would only be able to deal with her medication refills and assessing her cough today.  She was wondering about having some seborrheic keratosis spots removed today.  I did encourage her to reschedule and make an appointment as a procedure with her PCP to have these removed.  I did offer to send her to dermatology, she said that dermatology office visits are more expensive for her and she would rather have her seborrheic keratosis removed here in the office.  Chief Complaint   Patient presents with     Cough     off and on x 3 months - chills, fevers         Patient Active Problem List   Diagnosis     Seborrheic Keratosis     Generalized Anxiety Disorder     Insomnia     Foot Sprain     Herpes Simplex Type I     Actinic Keratosis     Abdominal Pain     Restless Legs Syndrome     Vitamin D Deficiency     Constipation     Joint Pain, Localized In The Hip     Dizziness     Pruritus ani     Anxiety     Irritable bowel     Hiatal hernia     Increased urinary frequency       Current Outpatient Prescriptions   Medication Sig Dispense Refill     clonazePAM (KLONOPIN) 1 MG tablet Take 1 mg by mouth once.        fluticasone (FLONASE) 50 mcg/actuation nasal spray 1 spray into each nostril daily.       linaclotide (LINZESS) 145 mcg cap capsule Take 1 capsule (145 mcg total) by mouth daily. 30 capsule 5     omeprazole (PRILOSEC) 40 MG capsule Take 1 capsule (40 mg total) by mouth daily. 30 capsule 5     ospemifene (OSPHENA) 60 mg Tab        oxybutynin (DITROPAN XL) 10 MG ER tablet Take 1 tablet (10 mg total) by mouth daily. 30 tablet 5     PARoxetine (PAXIL) 40 MG tablet Take 1 tablet (40 mg total) by mouth every morning. 90 tablet 0     QUEtiapine (SEROQUEL XR) 200 MG 24 hr tablet        rOPINIRole (REQUIP) 0.25 MG tablet Take 1 tablet (0.25 mg total) by mouth bedtime. 30 tablet 2      simethicone (MYLICON) 80 MG chewable tablet Chew 1 tablet (80 mg total) every 6 (six) hours as needed for flatulence. 100 tablet 3     TRIAMCINOLONE ACETONIDE (NASACORT NASL) into each nostril.       zolpidem (AMBIEN CR) 12.5 MG CR tablet TAKE 1 TABLET BY MOUTH AT BEDTIME AS NEEDED  3     codeine-guaiFENesin (GUAIFENESIN AC)  mg/5 mL liquid Take 5 mL by mouth 2 (two) times a day as needed for cough. 240 mL 0     mupirocin (BACTROBAN) 2 % ointment Apply to affected area 3 times daily 22 g 0     No current facility-administered medications for this visit.        History   Smoking Status     Never Smoker   Smokeless Tobacco     Never Used       REVIEW OF SYSTEMS: Denies sore throat/rhinorrhea/ear pain/swollen glands/shortness of breath/abdominal pain/changes in bowel habits/urinary symptoms/rash.      TOBACCO USE:  History   Smoking Status     Never Smoker   Smokeless Tobacco     Never Used     Social History     Social History     Marital status:      Spouse name: N/A     Number of children: N/A     Years of education: N/A     Occupational History     Not on file.     Social History Main Topics     Smoking status: Never Smoker     Smokeless tobacco: Never Used     Alcohol use Not on file     Drug use: Not on file     Sexual activity: Not on file     Other Topics Concern     Not on file     Social History Narrative         OBJECTIVE:            Vitals:    12/27/17 0819   BP: 92/58   Pulse: 91   Resp: 16   Temp: 98.1  F (36.7  C)   SpO2: 98%     Weight: 152 lb (68.9 kg)  Wt Readings from Last 3 Encounters:   12/27/17 152 lb (68.9 kg)   06/30/17 162 lb (73.5 kg)     Body mass index is 23.46 kg/(m^2).        Physical Exam:  GENERAL APPEARANCE: A&A, NAD, well hydrated, well nourished  HEAD: atraumatic, no deformity  EYES: PERRL, EOM's intact, no redness or swelling  EARS: TM's normal, gray with nl light reflex  NOSE: no post nasal drainage or thrush  MOUTH: without erythema, exudate or thrush  NECK: Supple,  without lymphadenopathy, no thyroid mass, no JVD, no bruit  CV: RRR, no M/G/R   LUNGS: CTAB, normal respiratory effort  ABDOMEN: S&NT, no masses, no organomegaly, BS present x4   SKIN:  Normal skin turgor, no lesions/rashes, warm and dry

## 2021-06-16 PROBLEM — R35.0 INCREASED URINARY FREQUENCY: Status: ACTIVE | Noted: 2017-06-30

## 2021-06-16 PROBLEM — N81.3 UTEROVAGINAL PROLAPSE, COMPLETE: Status: ACTIVE | Noted: 2018-12-05

## 2021-06-18 NOTE — PATIENT INSTRUCTIONS - HE
Patient Instructions by Faiza Myles MD at 9/18/2020  7:40 AM     Author: Faiza Myles MD Service: -- Author Type: Physician    Filed: 9/18/2020  8:03 AM Encounter Date: 9/18/2020 Status: Signed    : Faiza Myles MD (Physician)         Patient Education   Understanding Advance Care Planning  Advance care planning is the process of deciding ones own future medical care. It helps ensure that if you cant speak for yourself, your wishes can still be carried out. The plan is a series of legal documents that note a persons wishes. The documents vary by state. Advance care planning may be done when a person has a serious illness that is expected to get worse. It may be done before major surgery. And it can help you and your family be prepared in case of a major illness or injury. Advance care planning helps with making decisions at these times.       A health care proxy is a person who acts as the voice of a patient when the patient cant speak for himself or herself. The name of this role varies by state. It may be called a Durable Medical Power of  or Durable Power of  for Healthcare. It may be called an agent, surrogate, or advocate. Or it may be called a representative or decision maker. It is an official duty that is identified by a legal document. The document also varies by state.    Why Is Advance Care Planning Important?  If a person communicates their healthcare wishes:    They will be given medical care that matches their values and goals.    Their family members will not be forced to make decisions in a crisis with no guidance.  Creating a Plan  Making an advance care plan is often done in 3 steps:    Thinking about ones wishes. To create an advance care plan, you should think about what kind of medical treatment you would want if you lose the ability to communicate. Are there any situations in which you would refuse or stop treatment? Are there  therapies you would want or not want? And whom do you want to make decisions for you? There are many places to learn more about how to plan for your care. Ask your doctor or  for resources.    Picking a health care proxy. This means choosing a trusted person to speak for you only when you cant speak for yourself. When you cannot make medical decisions, your proxy makes sure the instructions in your advance care plan are followed. A proxy does not make decisions based on his or her own opinions. They must put aside those opinions and values if needed, and carry out your wishes.    Filling out the legal documents. There are several kinds of legal documents for advance care planning. Each one tells health care providers your wishes. The documents may vary by state. They must be signed and may need to be witnessed or notarized. You can cancel or change them whenever you wish. Depending on your state, the documents may include a Healthcare Proxy form, Living Will, Durable Medical Power of , Advance Directive, or others.  The Familys Role  The best help a family can give is to support their loved ones wishes. Open and honest communication is vital. Family should express any concerns they have about the patients choices while the patient can still make decisions.    [id number] The [email or web address]. [address] [place] [address], [place] [address]. All rights reserved. This information is not intended as a substitute for professional medical care. Always follow your healthcare professional's instructions.         Also, [employer] [place] offers a free, downloadable health care directive that allows you to share your treatment choices and personal preferences if you cannot communicate your wishes. It also allows you to appoint another person (called a health care agent) to make health care decisions if you are unable to do so. You can download an advance directive by going here:  [email or web address]     Patient Education   Personalized Prevention Plan  You are due for the preventive services outlined below.  Your care team is available to assist you in scheduling these services.  If you have already completed any of these items, please share that information with your care team to update in your medical record.  Health Maintenance   Topic Date Due   ? ZOSTER VACCINES (2 of 3) 12/15/2015   ? INFLUENZA VACCINE RULE BASED (1) 08/01/2020   ? PNEUMOCOCCAL IMMUNIZATION 65+ LOW/MEDIUM RISK (2 of 2 - PPSV23) 09/16/2020   ? MEDICARE ANNUAL WELLNESS VISIT  09/18/2021   ? FALL RISK ASSESSMENT  09/18/2021   ? DXA SCAN  10/28/2021   ? MAMMOGRAM  09/08/2022   ? COLORECTAL CANCER SCREENING  11/21/2023   ? LIPID  09/16/2024   ? ADVANCE CARE PLANNING  09/16/2024   ? TD 18+ HE  11/11/2025   ? HEPATITIS B VACCINES  Aged Out   ? HEPATITIS C SCREENING  Discontinued

## 2021-06-18 NOTE — PATIENT INSTRUCTIONS - HE
Anticoagulation Summary  As of 5/22/2018    INR goal:   2.5-3.5   TTR:   65.4 % (2.9 y)   Today's INR:   4.84! (5/21/2018)   Warfarin maintenance plan:   5 mg (5 mg x 1) on Wed; 10 mg (5 mg x 2) all other days   Weekly warfarin total:   65 mg   Plan last modified:   Tan Helton PharmD (5/22/2018)   Next INR check:   5/29/2018   Target end date:   Indefinite    Indications    Paroxysmal atrial fibrillation (HCC) [I48.0]  Mitral stenosis s/p Mechanical MVR [I05.0]             Anticoagulation Episode Summary     INR check location:   Outside Lab    Preferred lab:       Send INR reminders to:       Comments:   [facility]       Anticoagulation Care Providers     Provider Role Specialty Phone number    Bird Rodriguez D.O. Referring Family Medicine [phone]    Trey Dubon M.D. Referring Cardiac Surgery [phone]    Taj Allen, PharmD Responsible          Anticoagulation Patient Findings      INR  supra-therapeutic.   Left a voice message for the patient, asked patient to please call the anticoagulation clinic if they have any signs/symptoms of bleeding and/or thrombosis or any changes to diet or medications.    Follow up appointment in 1 week(s)    Hold today then decrease weekly warfarin dose as noted      Tan Helton, [place]     Patient Instructions by Faiza Myles MD at 9/18/2020  8:00 AM     Author: Faiza Myles MD Service: -- Author Type: Physician    Filed: 9/18/2020  8:34 AM Encounter Date: 9/18/2020 Status: Signed    : Faiza Myles MD (Physician)         Patient Education   Understanding Advance Care Planning  Advance care planning is the process of deciding ones own future medical care. It helps ensure that if you cant speak for yourself, your wishes can still be carried out. The plan is a series of legal documents that note a persons wishes. The documents vary by state. Advance care planning may be done when a person has a serious illness that is expected to get worse. It may be done before major surgery. And it can help you and your family be prepared in case of a major illness or injury. Advance care planning helps with making decisions at these times.       A health care proxy is a person who acts as the voice of a patient when the patient cant speak for himself or herself. The name of this role varies by state. It may be called a Durable Medical Power of  or Durable Power of  for Healthcare. It may be called an agent, surrogate, or advocate. Or it may be called a representative or decision maker. It is an official duty that is identified by a legal document. The document also varies by state.    Why Is Advance Care Planning Important?  If a person communicates their healthcare wishes:    They will be given medical care that matches their values and goals.    Their family members will not be forced to make decisions in a crisis with no guidance.  Creating a Plan  Making an advance care plan is often done in 3 steps:    Thinking about ones wishes. To create an advance care plan, you should think about what kind of medical treatment you would want if you lose the ability to communicate. Are there any situations in which you would refuse or stop treatment? Are there  therapies you would want or not want? And whom do you want to make decisions for you? There are many places to learn more about how to plan for your care. Ask your doctor or  for resources.    Picking a health care proxy. This means choosing a trusted person to speak for you only when you cant speak for yourself. When you cannot make medical decisions, your proxy makes sure the instructions in your advance care plan are followed. A proxy does not make decisions based on his or her own opinions. They must put aside those opinions and values if needed, and carry out your wishes.    Filling out the legal documents. There are several kinds of legal documents for advance care planning. Each one tells health care providers your wishes. The documents may vary by state. They must be signed and may need to be witnessed or notarized. You can cancel or change them whenever you wish. Depending on your state, the documents may include a Healthcare Proxy form, Living Will, Durable Medical Power of , Advance Directive, or others.  The Familys Role  The best help a family can give is to support their loved ones wishes. Open and honest communication is vital. Family should express any concerns they have about the patients choices while the patient can still make decisions.    [id number] [employer]. [address] [place] [address], [place] [address]. All rights reserved. This information is not intended as a substitute for professional medical care. Always follow your healthcare professional's instructions.         Also, [employer] [place] offers a free, downloadable health care directive that allows you to share your treatment choices and personal preferences if you cannot communicate your wishes. It also allows you to appoint another person (called a health care agent) to make health care decisions if you are unable to do so. You can download an advance directive by going here:  [email or web address]     Patient Education   Personalized Prevention Plan  You are due for the preventive services outlined below.  Your care team is available to assist you in scheduling these services.  If you have already completed any of these items, please share that information with your care team to update in your medical record.  Health Maintenance   Topic Date Due   ? ZOSTER VACCINES (2 of 3) 12/15/2015   ? INFLUENZA VACCINE RULE BASED (1) 08/01/2020   ? PNEUMOCOCCAL IMMUNIZATION 65+ LOW/MEDIUM RISK (2 of 2 - PPSV23) 09/16/2020   ? MEDICARE ANNUAL WELLNESS VISIT  09/18/2021   ? FALL RISK ASSESSMENT  09/18/2021   ? DXA SCAN  10/28/2021   ? MAMMOGRAM  09/08/2022   ? COLORECTAL CANCER SCREENING  11/21/2023   ? LIPID  09/16/2024   ? ADVANCE CARE PLANNING  09/16/2024   ? TD 18+ HE  11/11/2025   ? HEPATITIS B VACCINES  Aged Out   ? HEPATITIS C SCREENING  Discontinued

## 2021-06-19 NOTE — PROGRESS NOTES
DATE OF SERVICE: 07/02/2018    SUBJECTIVE:  A very pleasant 63-year-old female who was recently in [place]  with her grandchildren and her granddaughter had a severe coughing and ended up  being diagnosed with pneumonia.  She now comes home and notices that for 1 week she  has had a mild productive cough.  There was one episode where she had a mild fever.       REVIEW OF SYSTEMS:  Negative for dyspnea or syncope or chest pain.      Socially she does not smoke.    PHYSICAL EXAMINATION:    VITAL SIGNS:  Blood pressure 110/78, pulse 104, respiration 20, temperature 98.0.  HEENT:  TMs clear.  Sclerae not injected.  Pharynx is slightly erythematous.    NECK:  Supple, with shotty cervical lymphadenopathy.  LUNGS:  Have bilateral wheezes and crackles.  HEART:  Regular rhythm, normal S1, S2.      Examination of the chest x-ray shows bony architecture intact.  Costophrenic angles  are sharp.  There is focal infiltrates bilaterally, right greater than left,  particularly in the bases.  Cardiac silhouette is within normal limits.      ASSESSMENT:  Community acquired pneumonia.    PLAN:    1.  Rocephin 1 gram IM.  2.  Zithromax pack.  3.  Increase fluids and rest.  4.  Robitussin with codeine at night for cough.  5.  Sudafed for congestion.  6.  Return in 1 week if not improving.      PARKER JOHNSTON MD  pa  GINETTE 07/02/2018 10:01:54  T 07/02/2018 16:08:04  R 07/02/2018 16:08:04  [id number]        cc:VIVIENNE JOHNSTON MD

## 2021-06-19 NOTE — PROGRESS NOTES
DATE OF SERVICE: 07/09/2018    SUBJECTIVE:  Very pleasant 63-year-old female for recheck.  The patient was seen by  me on 07/02/2018 for community acquired pneumonia.  She was given Rocephin and  Zithromax as well as Sudafed for congestion.  She also used Robitussin with codeine.   She felt the Robitussin with codeine made her somewhat groggy.  The Sudafed was  quite effective.    REVIEW OF SYSTEMS.  Negative for cough, fever, vomiting or diarrhea.    SOCIAL HISTORY:  She does not smoke.    OBJECTIVE:    VITAL SIGNS:  96/60, pulse 85, respirations 20.  HEENT:  Grossly is normal.  LUNGS:  Have no adventitial sounds, are totally clear.  HEART:  Regular rate and rhythm.    SKIN:  Pink and dry.    ASSESSMENT:  Community acquired pneumonia, resolving.    PLAN:    1.  Continue Sudafed and that was refilled.  2.  Robitussin with codeine only as needed.  3.  Follow up if not improving.      PARKER JOHNSTON MD  pa  GINETTE 07/09/2018 14:22:23  T 07/09/2018 14:46:46  R 07/09/2018 14:46:46  [id number]        cc:VIVIENNE JOHNSTON MD

## 2021-06-19 NOTE — LETTER
[facility] Hospitalist Progress Note    Juan STEARNS Works Patient Status:  Inpatient    1944 MRN [id number]   Location [facility] SURGICAL INTENSIVE CARE Attending Channing Otto MD   Hosp Day # 1 PCP Mara TERRAZAS MD     Chief Complaint:  Vomiting (Pt reports vomiting/generalized abdominal pain x 1 day. Pt denies dysuria/fever/diarrhea. Pt does state recent constipation. ) and Abdominal Pain      Subjective:  Patient reports intermittent abdominal pain and discomfort. Reports having had a small BM this morning. No fever or chills.    Objective:    Vitals:    21 1520 21 1600 21 1700 21 1800   Temp:  95.2 °F (35.1 °C)     Pulse: (!) 102      Resp: 12      SpO2: 100% 94% 93% 95%   BP: 97/61       21 1900   Temp:    Pulse: (!) 102   Resp: 20   SpO2: 96%   BP:        Physical Exam   Constitutional: He is well-developed, well-nourished, and in no distress.   Neck: Normal range of motion. Neck supple.   Cardiovascular: Normal rate and regular rhythm.   Pulmonary/Chest: Effort normal and breath sounds normal. No respiratory distress. He has no wheezes.   Abdominal: He exhibits distension. There is abdominal tenderness.   Musculoskeletal: Normal range of motion.   Neurological: He is alert.   Skin: Skin is warm and dry.          LABS:    CBC  Recent Labs   Lab 21  0852 21  0530 21   WBC 7.9 7.9 7.5   HCT 56.0* 54.7* 56.7*   HGB 18.5* 18.1* 18.6*    183 212       CMP  Recent Labs   Lab 21  0852 21  0530 21   SODIUM 144 146* 148*   POTASSIUM 4.4 4.7 4.1   CHLORIDE 102 105 106   CO2 28 32 30   GLUCOSE 77 76 120*   BUN 62* 59* 36*   CREATININE 4.53* 4.22* 2.00*   CALCIUM 9.4 9.6 10.7*   TOTPROTEIN 6.6 6.5 8.0   ALBUMIN 2.8* 2.8* 3.8   BILIRUBIN 0.7 0.7 0.8   AST 21 17 18   GPT 26 23 39   ALKPT 51 52 68       Cardiac Labs  Recent Labs   Lab 21  0653 21   RAPDTR 0.11* 0.06*   NTPROB  --  2,595*       Lipid Panel  No results  Letter by Faiza Myles MD at      Author: Faiza Myles MD Service: -- Author Type: --    Filed:  Encounter Date: 9/24/2019 Status: Signed         Nandini Yang  [address] [facility] [address]             September 24, 2019         Dear Ms. Yang,    Below are the results from your recent visit:    Resulted Orders   HM2(CBC w/o Differential)   Result Value Ref Range    WBC 5.5 4.0 - 11.0 thou/uL    RBC 4.71 3.80 - 5.40 mill/uL    Hemoglobin 14.6 12.0 - 16.0 g/dL    Hematocrit 41.9 35.0 - 47.0 %    MCV 89 80 - 100 fL    MCH 31.1 27.0 - 34.0 pg    MCHC 34.9 32.0 - 36.0 g/dL    RDW 13.0 11.0 - 14.5 %    Platelets 236 140 - 440 thou/uL    MPV 7.6 7.0 - 10.0 fL   Lipid Cascade   Result Value Ref Range    Cholesterol 246 (H) <=199 mg/dL    Triglycerides 65 <=149 mg/dL    HDL Cholesterol 85 >=50 mg/dL    LDL Calculated 148 (H) <=129 mg/dL    Patient Fasting > 8hrs? Yes    Basic Metabolic Panel   Result Value Ref Range    Sodium 140 136 - 145 mmol/L    Potassium 4.3 3.5 - 5.0 mmol/L    Chloride 105 98 - 107 mmol/L    CO2 24 22 - 31 mmol/L    Anion Gap, Calculation 11 5 - 18 mmol/L    Glucose 93 70 - 125 mg/dL    Calcium 9.6 8.5 - 10.5 mg/dL    BUN 16 8 - 22 mg/dL    Creatinine 0.79 0.60 - 1.10 mg/dL    GFR MDRD Af Amer >60 >60 mL/min/1.73m2    GFR MDRD Non Af Amer >60 >60 mL/min/1.73m2    Narrative    Fasting Glucose reference range is 70-99 mg/dL per  American Diabetes Association (ADA) guidelines.   Gynecologic Cytology (PAP Smear)   Result Value Ref Range    Case Report       Gynecologic Cytology Report                       Case: [id number]                                   Authorizing Provider:  Faiza Myles           Collected:           09/16/2019 Alfonso Collazo MD                                                                Ordering Location:     [facility]       Received:            09/16/2019 1003                                      Family Medicine/OB                                                           First Screen:          Cassy Thomas, [place]                                                                               (ASCP)                                                                       Specimen:    SUREPATH PAP, SCREENING, Endocervical/cervical                                             Interpretation  Negative for squamous intraepithelial lesion or malignancy.      Negative for squamous intraepithelial lesion or malignancy    Result Flag Normal Normal    Specimen Adequacy       Satisfactory for evaluation, endocerv/transformation zone component absent, atrophy    HPV Reflex? Yes regardless of result     HIGH RISK No     LMP/Menopause Date age 51     Abnormal Bleeding No     Pt Status partial hyst     Birth Control/Hormones None     Previous Normal/Date unsure     Prev Abn Date/Dx na     Cervical Appearance Normal    HPV High Risk DNA Cervical   Result Value Ref Range    HPV Source SurePath     HPV16 DNA Negative NEG    HPV18 DNA Negative NEG    Other HR HPV Negative NEG    Final Diagnosis SEE NOTES       Comment:      This patient's sample is negative for HPV DNA.  This test was developed and its performance characteristics determined by the  [facility]. It  has not been cleared or approved by the FDA. The laboratory is regulated under  CLIA as qualified to perform high-complexity testing. This test is used for  clinical purposes. It should not be regarded as investigational or for  research.  (Note)  METHODOLOGY:  The Roche joy 4800 system uses automated extraction,  simultaneous amplification of HPV (L1 region) and beta-globin,  followed by  real time detection of fluorescent labeled HPV and beta  globin using specific oligonucleotide probes . The test specifically  identifies types HPV 16 DNA and HPV 18 DNA while concurrently  detecting the rest of  found    Coagulation Panel  Recent Labs   Lab 03/26/21  0852   INR 1.1   PTT 34*       Arterial Blood Gas  Recent Labs   Lab 03/26/21  1837   RAPH 7.40   RAPCO2 35   RAPO2 74*   RAHCO3 22   RASAT 95       IMAGING:    ECG:   Encounter Date: 03/25/21   ECG   Result Value    Ventricular Rate EKG/Min (BPM) 93    Atrial Rate (BPM) 93    VA-Interval (MSEC) 148    QRS-Interval (MSEC) 160    QT-Interval (MSEC) 384    QTc 478    P Axis (Degrees) 75    R Axis (Degrees) -101    T Axis (Degrees) 81    REPORT TEXT      Normal sinus rhythm  Nonspecific intraventricular block  Possible  Lateral infarct  , age undetermined  Abnormal ECG  No previous ECGs available          Echocardiogram  Results for orders placed in visit on 11/22/19   TRANSTHORACIC ECHO(TTE) COMPLETE W/ W/O IMAGING AGENT       No results found for this or any previous visit.      Imaging Last 24hrs   Xr Chest Pa Or Ap 1 View    Result Date: 3/26/2021  EXAM: AP portable semiupright chest Indication fever Comparing x-ray from the day before.     FINDINGS with IMPRESSION: Improved inspiration. No distinct consolidation. Interstitial thickening in both lower lungs which may represent fibrosis or atelectasis. Normal heart size. Life Vest remains in place. New alimentary tube with tip not clearly defined beyond the midesophagus on this x-ray due to overlying life Vest (was in the projection of stomach on the x-ray from the previous evening). Electronically Signed by: LULA EMANUEL M.D. Signed on: 3/26/2021 10:08 AM      Cath Report:  Results for orders placed or performed during the hospital encounter of 12/29/20   Cath/PV Case    Narrative    No obstructive epicardial coronary artery disease    A/p  Continue treatment for CHF and ICD placement           Assessment & Plan:  Juan Eckert is a(n) 76 year old male  has a past medical history of Arthritis, CKD (chronic kidney disease) stage 3, GFR 30-59 ml/min (CMS/HCC), Congestive cardiac failure (CMS/HCC), Dyspnea,  Essential (primary) hypertension, Gastroesophageal reflux disease, High cholesterol, Malignant neoplasm of prostate (CMS/HCC) (2/20/2020), Palpitations (9/20/2019), Sarcoidosis, Stroke (CMS/[facility]), Type 2 diabetes mellitus with stage 3 chronic kidney disease, with long-term current use of insulin (CMS/[facility]), and Type 2 myocardial infarction (CMS/[facility]) (3/25/2021). He also has no past medical history of Allergy, Alzheimer's dementia (CMS/[facility]), Anemia, Anxiety, Attention deficit disorder, Blood clot associated with vein wall inflammation, Blood transfusion without reported diagnosis, Bronchitis, Cataract, Chronic pain, Clotting disorder (CMS/[facility]), COPD (chronic obstructive pulmonary disease) (CMS/[facility]), Coronary artery disease, Depression, Depressive disorder, Difficult intubation, Emphysema of lung (CMS/[facility]), Failed moderate sedation during procedure, Fracture, GERD (gastroesophageal reflux disease), Glaucoma (increased eye pressure), Heart murmur, HIV disease (CMS/[facility]), Inflammatory bowel disease, Liver disease, Malignant hyperthermia, Meningitis, MRSA (methicillin resistant Staphylococcus aureus), Neuromuscular disorder (CMS/[facility]), Osteoporosis, Otitis media, Pneumonia, PONV (postoperative nausea and vomiting), RAD (reactive airway disease), Seizures (CMS/[facility]), Sickle cell anemia (CMS/[facility]), Sinusitis, chronic, Sleep apnea, Spinal headache, Substance abuse (CMS/[facility]), Thyroid condition, Thyroid disease, Tuberculosis, Uncomplicated senile dementia (CMS/[facility]), Urinary incontinence, or Urinary tract infection. presenting with        * Small bowel obstruction (CMS/[facility])  Assessment & Plan  D/W GS; plan for exploratory laparotomy later today.    Acute kidney injury (CMS/[facility])  Assessment & Plan  Secondary to dehydration and decreased PO intake.   Check bladder scan and renal US to r/o urinary retention.  Repeat labs to ensure accuracy.  Renal consult.    Type 2 myocardial infarction (CMS/HCC)  Assessment & Plan  EKG with baseline  the high risk types (31, 33, 35, 39, 45, 51,  52, 56, 58, 59, 66 or 68).    COMMENTS:  This test is not intended for use as a screening device  for women under age 30 with normal cervical   cytology.  Results should  be correlated with cytologic and histologic findings. Close clinical  followup is recommended.        Specimen Description Cervical Cells       Comment:        Performed and/or entered by:  [facility] [address] [address]        Your hemoglobin is normal as are your electrolytes, kidneys and liver tests.     Your cholesterol is up quite a bit from last time it was checked. It is still well under treatment range at this time, but continue to watch your diet and work on exercise. It certainly could be partially from the Seroquel, which you can discuss with psychiatry. We recommend treatment when the LDL is over 170 or your risk of a heart attack is more than 7.5% in 10 years and you are at 4%.     Your pap smear was normal and your HPV testing was negative, we likely do not need to do anymore pap smears at this time.     Please call with questions or contact us using [employer].    Sincerely,        Electronically signed by Faiza Myles MD     The 10-year ASCVD risk score (Greta ALEXANDRE Jr., et al., 2013) is: 4%    Values used to calculate the score:      Age: 64 years      Sex: Female      Is Non- : No      Diabetic: No      Tobacco smoker: No      Systolic Blood Pressure: 112 mmHg      Is BP treated: No      HDL Cholesterol: 85 mg/dL      Total Cholesterol: 246 mg/dL         LBBB.  Troponin mildly elevated but consistent with chronic elevation.  No active cardiac symptoms from patient.  F/U echo.  D/W cardiology service; continue medical therapy.    Ventricular tachycardia (CMS/[facility])  Assessment & Plan  Has life vest.   Monitor on tele for now.    Osteoarthritis of lumbar spine  Assessment & Plan  Pain meds as needed.    Atherosclerosis of aorta (CMS/[facility])  Assessment & Plan  Aspirin and statin.    Hemiplegia of left nondominant side as late effect of cerebrovascular disease (CMS/[facility])  Assessment & Plan  ASA/Statin when able.    Non-ischemic cardiomyopathy (CMS/[facility])  Assessment & Plan  S/P cardiac cath in 12/2020 without obstructive CAD.  Monitor on telemetry.  EF 15-20%. Repeat echo pending.  Has lifevest.  Continue on GDMT when able.    Chronic gout due to renal impairment of multiple sites without tophus  Assessment & Plan  No symptoms at this time.    Osteoarthritis of both knees  Assessment & Plan  Pain meds as needed.    Sarcoidosis  Assessment & Plan  Does not have any symptoms at this time.     Dyslipidemia  Assessment & Plan  Statin when able.    Type 2 diabetes mellitus with stage 3 chronic kidney disease, with long-term current use of insulin (CMS/[facility])  Assessment & Plan  Continue insulin and monitor ACCKs.    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          Current Diet    Dietary Orders (From admission, onward)     Start     Ordered    03/24/21 1935  NPO Diet without Exceptions  (ED Adult Abdominal Pain, V/D, GI Bleeding)  DIET EFFECTIVE NOW     Question:  NPO Modifier  Answer:  without Exceptions    03/24/21 1934                DVT Prophylaxis    Current Active Medications for DVT Prophylaxis (From admission, onward)         Stop     heparin (porcine) injection 5,000 Units  5,000 Units,   Subcutaneous,   3 times per  day      --                Code Status      Code Status: Full Resuscitation    Primary Care Physician    Mara TERRAZAS MD    Dispo: Pending clinical workup and improvement.      Discussed at length above, including imaging, laboratory, and procedural findings with (*patient*) who verbalized understanding and agreement.      Sam Parks MD    Contact via [employer]    Current Facility-Administered Medications   Medication Dose Route Frequency Provider Last Rate Last Admin   • propofol (DIPRIVAN) infusion  0-80 mcg/kg/min (Dosing Weight) Intravenous Continuous Anne-Marie Wilkerson MD 13.3 mL/hr at 03/26/21 1908 25 mcg/kg/min at 03/26/21 1908   • fentaNYL (SUBLIMAZE) 2,500 mcg/250 mL in sodium chloride 0.9 % infusion  0-200 mcg/hr Intravenous Continuous Anne-Marie Wilkerson MD 10 mL/hr at 03/26/21 1939 100 mcg/hr at 03/26/21 1939   • MIDAZolam (VERSED) injection 2 mg  2 mg Intravenous Q1H PRN Terell Carson MD       • dexMEDETomidine (PRECEDEX) 400 mcg/100 mL in sodium chloride 0.9 % infusion  0-1.7 mcg/kg/hr (Dosing Weight) Intravenous Continuous Anne-Marie Wilkerson MD       • DOBUTamine (DOBUTREX) 500 mg/250 mL in dextrose 5 % infusion  5 mcg/kg/min (Dosing Weight) Intravenous Continuous Chavo Shankar MD 13.3 mL/hr at 03/26/21 1948 5 mcg/kg/min at 03/26/21 1948   • NORepinephrine (LEVOPHED) 8 mg/250 mL in sodium chloride 0.9 % infusion  0-199 mcg/min Intravenous Continuous Chavo Shankar MD 56.25 mL/hr at 03/26/21 1932 30 mcg/min at 03/26/21 1932   • acetaminophen (TYLENOL) tablet 650 mg  650 mg Oral Q4H PRN Channing Otto MD       • ondansetron (ZOFRAN) injection 4 mg  4 mg Intravenous Q4H PRN Channing Otto MD   4 mg at 03/26/21 1113   • labetalol (NORMODYNE) injection 10 mg  10 mg Intravenous Q4H PRN Channing Otto MD       • lidocaine viscous 2 % oral solution 15 mL  15 mL Oral Once Channing Otto MD       • sodium chloride 0.9 % flush bag 25 mL  25 mL Intravenous PRN Channing Otto MD       • sodium chloride  (PF) 0.9 % injection 2 mL  2 mL Intracatheter 2 times per day Channing Otto MD   2 mL at 03/26/21 0917   • sodium chloride (NORMAL SALINE) 0.9 % bolus 500 mL  500 mL Intravenous PRN Channing Otto MD   Stopped at 03/26/21 1240   • sodium chloride 0.9 % flush bag 25 mL  25 mL Intravenous PRN Channing Otto MD       • heparin (porcine) injection 5,000 Units  5,000 Units Subcutaneous 3 times per day Channing Otto MD   5,000 Units at 03/26/21 0518   • dextrose 5 % / sodium chloride 0.45% infusion   Intravenous Continuous Channing Otto MD 50 mL/hr at 03/26/21 0534 Rate Verify at 03/26/21 0534   • Magnesium Standard Replacement Protocol   Does not apply See Admin Instructions Channing Otto MD       • Potassium Standard Replacement Protocol   Does not apply See Admin Instructions Channing Otto MD       • prochlorperazine (COMPAZINE) injection 5 mg  5 mg Intravenous Q4H PRN Channing Otto MD       • docusate sodium-sennosides (SENOKOT S) 50-8.6 MG 2 tablet  2 tablet Oral Daily PRN Channing Otto MD       • polyethylene glycol (MIRALAX) packet 17 g  17 g Oral Daily PRN Channing Otto MD       • melatonin tablet 3 mg  3 mg Oral Nightly PRN Channing Otto MD       • dextrose 50 % injection 25 g  25 g Intravenous PRN Channing Otto MD       • dextrose 50 % injection 12.5 g  12.5 g Intravenous PRN Channing Otto MD   12.5 g at 03/26/21 1113   • glucagon (GLUCAGEN) injection 1 mg  1 mg Intramuscular PRN Channing Otto MD       • dextrose (GLUTOSE) 40 % gel 15 g  15 g Oral PRN Channing Otto MD       • dextrose (GLUTOSE) 40 % gel 30 g  30 g Oral PRN Channing Otto MD       • insulin lispro (ADMELOG,HumaLOG) scheduled AND correction dose   Subcutaneous TID AC Channing Otto MD   1 Units at 03/26/21 8790

## 2021-06-19 NOTE — LETTER
Letter by Faiza Myles MD at      Author: Faiza Myles MD Service: -- Author Type: --    Filed:  Encounter Date: 11/4/2019 Status: Signed         Nandini PATEL Yang  [address] [facility] [address]             November 4, 2019         Dear Ms. Yang,    Below are the results from your recent visit:      Your bone density scan showed that you have some thinning but no osteoporosis yet. Please aim to ingest about 1200 mg of calcium daily with food as well as supplements. We'll plan on repeating the scan in 2 years.     Please call with questions or contact us using [employer].    Sincerely,        Electronically signed by Faiza Myles MD

## 2021-06-19 NOTE — LETTER
Letter by Faiza Myles MD at      Author: Faiza Myels MD Service: -- Author Type: --    Filed:  Encounter Date: 9/23/2019 Status: Signed         Nandini Yang  [address] [facility] [address]             September 24, 2019         Dear Ms. Yang,    Below are the results from your recent visit:    Recent Results (from the past 240 hour(s))   HM2(CBC w/o Differential)   Result Value Ref Range    WBC 5.5 4.0 - 11.0 thou/uL    RBC 4.71 3.80 - 5.40 mill/uL    Hemoglobin 14.6 12.0 - 16.0 g/dL    Hematocrit 41.9 35.0 - 47.0 %    MCV 89 80 - 100 fL    MCH 31.1 27.0 - 34.0 pg    MCHC 34.9 32.0 - 36.0 g/dL    RDW 13.0 11.0 - 14.5 %    Platelets 236 140 - 440 thou/uL    MPV 7.6 7.0 - 10.0 fL   Lipid Cascade   Result Value Ref Range    Cholesterol 246 (H) <=199 mg/dL    Triglycerides 65 <=149 mg/dL    HDL Cholesterol 85 >=50 mg/dL    LDL Calculated 148 (H) <=129 mg/dL    Patient Fasting > 8hrs? Yes    Basic Metabolic Panel   Result Value Ref Range    Sodium 140 136 - 145 mmol/L    Potassium 4.3 3.5 - 5.0 mmol/L    Chloride 105 98 - 107 mmol/L    CO2 24 22 - 31 mmol/L    Anion Gap, Calculation 11 5 - 18 mmol/L    Glucose 93 70 - 125 mg/dL    Calcium 9.6 8.5 - 10.5 mg/dL    BUN 16 8 - 22 mg/dL    Creatinine 0.79 0.60 - 1.10 mg/dL    GFR MDRD Af Amer >60 >60 mL/min/1.73m2    GFR MDRD Non Af Amer >60 >60 mL/min/1.73m2   Gynecologic Cytology (PAP Smear)   Result Value Ref Range    Case Report       Gynecologic Cytology Report                       Case: [id number]                                   Authorizing Provider:  Faiza Myles           Collected:           09/16/2019 Alfonso Collazo MD                                                                Ordering Location:     [facility]       Received:            09/16/2019 1003                                     Family Medicine/OB                                                            First Screen:          Cassy Thomas, [place]                                                                               (ASCP)                                                                       Specimen:    SUREPATH PAP, SCREENING, Endocervical/cervical                                             Interpretation  Negative for squamous intraepithelial lesion or malignancy.      Negative for squamous intraepithelial lesion or malignancy    Result Flag Normal Normal    Specimen Adequacy       Satisfactory for evaluation, endocerv/transformation zone component absent, atrophy    HPV Reflex? Yes regardless of result     HIGH RISK No     LMP/Menopause Date age 51     Abnormal Bleeding No     Pt Status partial hyst     Birth Control/Hormones None     Previous Normal/Date unsure     Prev Abn Date/Dx na     Cervical Appearance Normal    HPV High Risk DNA Cervical   Result Value Ref Range    HPV Source SurePath     HPV16 DNA Negative NEG    HPV18 DNA Negative NEG    Other HR HPV Negative NEG    Final Diagnosis SEE NOTES     Specimen Description Cervical Cells      Your hemoglobin is normal as are your electrolytes, kidneys and liver tests.      Your cholesterol is up quite a bit from last time it was checked. It is still well under treatment range at this time, but continue to watch your diet and work on exercise. It certainly could be partially from the Seroquel, which you can discuss with psychiatry. We recommend treatment when the LDL is over 170 or your risk of a heart attack is more than 7.5% in 10 years and you are at 4%.      Your pap smear was normal and your HPV testing was negative, we likely do not need to do anymore pap smears at this time.         Please call with questions or contact us using [employer].    Sincerely,        Electronically signed by Faiza Myles MD

## 2021-06-20 NOTE — LETTER
Letter by Faiza Myles MD at      Author: Faiza Myles MD Service: -- Author Type: --    Filed:  Encounter Date: 9/22/2020 Status: (Other)         Nandini Yang  [address] [facility] [address]             September 22, 2020         Dear Ms. Yang,    Below are the results from your recent visit:    Resulted Orders   Lipid [place] FASTING   Result Value Ref Range    Cholesterol 235 (H) <=199 mg/dL    Triglycerides 106 <=149 mg/dL    HDL Cholesterol 78 >=50 mg/dL    LDL Calculated 136 (H) <=129 mg/dL    Patient Fasting > 8hrs? Yes    Comprehensive Metabolic Panel   Result Value Ref Range    Sodium 140 136 - 145 mmol/L    Potassium 4.5 3.5 - 5.0 mmol/L    Chloride 105 98 - 107 mmol/L    CO2 26 22 - 31 mmol/L    Anion Gap, Calculation 9 5 - 18 mmol/L    Glucose 94 70 - 125 mg/dL    BUN 15 8 - 22 mg/dL    Creatinine 0.77 0.60 - 1.10 mg/dL    GFR MDRD Af Amer >60 >60 mL/min/1.73m2    GFR MDRD Non Af Amer >60 >60 mL/min/1.73m2    Bilirubin, Total 0.9 0.0 - 1.0 mg/dL    Calcium 9.4 8.5 - 10.5 mg/dL    Protein, Total 6.6 6.0 - 8.0 g/dL    Albumin 4.1 3.5 - 5.0 g/dL    Alkaline Phosphatase 77 45 - 120 U/L    AST 19 0 - 40 U/L    ALT 14 0 - 45 U/L    Narrative    Fasting Glucose reference range is 70-99 mg/dL per  American Diabetes Association (ADA) guidelines.       Your electrolytes, kidneys and liver tests are normal. Your cholesterol has improved as well. Keep up the good work.        Please call with questions or contact us using [employer].    Sincerely,        Electronically signed by Faiza Myles MD

## 2021-06-21 NOTE — PROGRESS NOTES
Preoperative Exam    Scheduled Procedure: HYSTERECTOMY  Surgery Date: 12/4/18  [place] [facility] [phone]    Surgeon:  Dr. Bland     Assessment/Plan:     1. Pre-operative examination  -EKG done today as she is on Seroquel but has no other risk factors for heart disease, and this showed bradycardia but no QT prolongation  - Electrocardiogram Perform - Clinic  - 2(CBC w/o Differential)    2. Uterine prolapse  -Proceed with surgery as deemed medically necessary and appropriate by urology gynecology and anesthesia    3. Overactive bladder  -As stated above    4. Constipation  -Okay to continue taking senna laxative as needed prior to surgery    5. Colon cancer screening  -Order for cologuard placed per patient request  - Ambulatory referral for Colonoscopy     Surgical Procedure Risk: Intermediate (reported cardiac risk generally 1-5%)  Have you had prior anesthesia?: Yes  Have you or any family members had a previous anesthesia reaction:  No  Do you or any family members have a history of a clotting or bleeding disorder?: No  Cardiac Risk Assessment: no increased risk for major cardiac complications    Patient approved for surgery with general or local anesthesia.      Functional Status: Independent  Patient plans to recover at home with family.     Subjective:      Nandini Yang is a 63 y.o. female who presents for a preoperative consultation.      She comes in today for a pre-op prior to having a hysterectomy for OAB. She did see Dr. Jones for her physical, but she was told that her prolapse was down further than last year. She then saw Dr. Bautista and was told to stop drinking soda, took a bath and her uterus was down quite far. Went back in, got a pessary and then went and did errands and had to get it out. Her  travels a lot and her needs to be there so she has had some difficulty getting this arranged. She ishaving her uterus, ovaries, fallopian tubes.     She does worry that  having her cervix removed will make it difficult for her to have intercourse.     She does wonder if she can keep taking her laxative if needed.     She is doing well generally. She takes Seroquel at bedtime and only the clonazepam when things are quite stressful. She does get this through her psychiatrist.     All other systems reviewed and are negative, other than those listed in the HPI.    Pertinent History  Do you have difficulty breathing or chest pain after walking up a flight of stairs: No  History of obstructive sleep apnea: No  Steroid use in the last 6 months: No  Frequent Aspirin/NSAID use: No  Prior Blood Transfusion: No  Prior Blood Transfusion Reaction: No  If for some reason prior to, during or after the procedure, if it is medically indicated, would you be willing to have a blood transfusion?:  There is no transfusion refusal.    Current Outpatient Prescriptions   Medication Sig Dispense Refill     QUEtiapine (SEROQUEL XR) 200 MG 24 hr tablet        clonazePAM (KLONOPIN) 1 MG tablet Take 1 mg by mouth as needed.        fluticasone (FLONASE) 50 mcg/actuation nasal spray 1 spray into each nostril daily.       No current facility-administered medications for this visit.         No Known Allergies    Patient Active Problem List   Diagnosis     Seborrheic Keratosis     Generalized Anxiety Disorder     Insomnia     Herpes Simplex Type I     Actinic Keratosis     Abdominal Pain     Restless Legs Syndrome     Vitamin D Deficiency     Constipation     Joint Pain, Localized In The Hip     Dizziness     Pruritus ani     Anxiety     Irritable bowel     Hiatal hernia     Increased urinary frequency       History reviewed. No pertinent past medical history.    Past Surgical History:   Procedure Laterality Date     BREAST BIOPSY Right 1999       Social History     Social History     Marital status:      Spouse name: N/A     Number of children: N/A     Years of education: N/A     Occupational History     Not  "on file.     Social History Main Topics     Smoking status: Never Smoker     Smokeless tobacco: Never Used     Alcohol use Not on file     Drug use: Not on file     Sexual activity: Not on file     Other Topics Concern     Not on file     Social History Narrative       Patient Care Team:  Faiza Myles MD as PCP - General (Family Medicine)          Objective:     Vitals:    11/05/18 1213   BP: 108/66   Pulse: 72   Temp: 98.2  F (36.8  C)   TempSrc: Oral   SpO2: 99%   Weight: 144 lb 11.2 oz (65.6 kg)   Height: 5' 7.2\" (1.707 m)         Physical Exam:  Gen: Well developed, well nourished, no acute distress.  HEENT: normocephalic/atraumatic, PERRLA/EOMI, TMs: Gray, normal light reflex, no nasal discharge.  Oral mucosa: no erythema/exudate  Neck: No LAD/masses/thyromegaly  Lungs: clear bilaterally  Heart: regular rate and rhythm, no murmurs/gallops/rubs  Abdomen: Normal bowel sounds, soft, non-tender, non-distended, no masses, neg Herrera's/McBurney's, no rebound/guarding  Genital: deferred, done at OB  Lymphatics: no supraclavicular/axillary/epitrochlear/inguinal LAD. No edema.  Neuro: A&O x 3, CN II-XII intact, strength 5/5, reflexes symmetric, sensory intact to light touch.  Psych: Behavior appropriate, engaging.  Thought processes congruent, non-tangential.  Musculoskeletal: no gross deformities.  Skin: no rashes or lesions.       Patient Instructions     Hold all supplements, aspirin and NSAIDs for 7 days prior to surgery.  Follow your surgeon's direction on when to stop eating and drinking prior to surgery.  Your surgeon will be managing your pain after your surgery.    Remove all jewelry and metal piercings before your surgery.   Remove nail polish from fingers before surgery.  You can take your Seroquel the night before surgery like usual.   You can take your bisacodyl up to the day before surgery like usual.       Labs:  Recent Results (from the past 120 hour(s))   Electrocardiogram Perform - Clinic "    Collection Time: 11/05/18  1:16 PM   Result Value Ref Range    SYSTOLIC BLOOD PRESSURE  mmHg    DIASTOLIC BLOOD PRESSURE  mmHg    VENTRICULAR RATE 55 BPM    ATRIAL RATE 55 BPM    P-R INTERVAL 162 ms    QRS DURATION 74 ms    Q-T INTERVAL 414 ms    QTC CALCULATION (BEZET) 396 ms    P Axis 57 degrees    R AXIS 32 degrees    T AXIS 57 degrees    MUSE DIAGNOSIS       Sinus bradycardia  Possible Left atrial enlargement  Borderline ECG  When compared with ECG of 03-JAN-2011 11:49,  No significant change was found  Confirmed by YOLANDA JORDAN MD LOC: ([id number]) on 11/5/2018 2:32:27 PM     HM2(CBC w/o Differential)    Collection Time: 11/05/18  1:30 PM   Result Value Ref Range    WBC 6.9 4.0 - 11.0 thou/uL    RBC 4.77 3.80 - 5.40 mill/uL    Hemoglobin 14.4 12.0 - 16.0 g/dL    Hematocrit 42.4 35.0 - 47.0 %    MCV 89 80 - 100 fL    MCH 30.2 27.0 - 34.0 pg    MCHC 34.0 32.0 - 36.0 g/dL    RDW 12.0 11.0 - 14.5 %    Platelets 248 140 - 440 thou/uL    MPV 7.8 7.0 - 10.0 fL       Immunization History   Administered Date(s) Administered     DT (pediatric) 02/02/2006     Td,adult,historic,unspecified 02/02/2006     Tdap 11/11/2015     ZOSTER, LIVE 10/20/2015           Electronically signed by Faiza Myles MD 11/08/18 12:10 PM

## 2021-06-22 NOTE — ANESTHESIA CARE TRANSFER NOTE
Last vitals:   Vitals:    12/04/18 1316   BP: 128/74   Pulse: 93   Resp: 14   Temp: 36.7  C (98  F)   SpO2: 100%     Patient's level of consciousness is awake  Spontaneous respirations: yes  Maintains airway independently: yes  Dentition unchanged: yes  Oropharynx: oropharynx clear of all foreign objects    QCDR Measures:  ASA# 20 - Surgical Safety Checklist: WHO surgical safety checklist completed prior to induction    PQRS# 430 - Adult PONV Prevention: 4558F - Pt received => 2 anti-emetic agents (different classes) preop & intraop  ASA# 8 - Peds PONV Prevention: 4558F - Pt received => 2 anti-emetic agents (different classes) preop & intraop  PQRS# 424 - Margot-op Temp Management: 4559F - At least one body temp DOCUMENTED => 35.5C or 95.9F within required timeframe  PQRS# 426 - PACU Transfer Protocol: - Transfer of care checklist used  ASA# 14 - Acute Post-op Pain: ASA14B - Patient did NOT experience pain >= 7 out of 10   The patient is Spont Breathing, responding to commands,  Reflexes  Intact.  VSS

## 2021-06-22 NOTE — ANESTHESIA PREPROCEDURE EVALUATION
Anesthesia Evaluation        Airway   Mallampati: I   Pulmonary - negative ROS and normal exam                          Cardiovascular - negative ROS and normal exam   Neuro/Psych    (+) neuromuscular disease,      Endo/Other - negative ROS      GI/Hepatic/Renal    (+) hiatal hernia,             Dental                         Anesthesia Plan  Planned anesthetic: general endotracheal    ASA 3   Induction: intravenous   Anesthetic plan and risks discussed with: patient

## 2021-06-22 NOTE — ANESTHESIA POSTPROCEDURE EVALUATION
Patient: Nandini Yang  (1 SUPRACERVICAL HYSTERECTOMY WITH BILATERAL SALPINGO-OOPHORECTOMY, LAPAROSCOPIC SACROCOLPOPEXY, CYSTOSCOPY, CYSTOSCOPY  Anesthesia type: general    Patient location: PACU  Last vitals:   Vitals:    12/04/18 1400   BP: 100/50   Pulse: 85   Resp: 12   Temp:    SpO2: 99%     Post vital signs: stable  Level of consciousness: awake and responds to simple questions  Post-anesthesia pain: pain controlled  Post-anesthesia nausea and vomiting: no  Pulmonary: unassisted, return to baseline  Cardiovascular: stable and blood pressure at baseline  Hydration: adequate  Anesthetic events: no    QCDR Measures:  ASA# 11 - Margot-op Cardiac Arrest: ASA11B - Patient did NOT experience unanticipated cardiac arrest  ASA# 12 - Margot-op Mortality Rate: ASA12B - Patient did NOT die  ASA# 13 - PACU Re-Intubation Rate: ASA13B - Patient did NOT require a new airway mgmt  ASA# 10 - Composite Anes Safety: ASA10A - No serious adverse event    Additional Notes:

## 2021-07-03 NOTE — ADDENDUM NOTE
Addendum Note by Faiza Hernandez MD at 9/16/2019  9:00 AM     Author: Faiza Hernandez MD Service: -- Author Type: Physician    Filed: 9/19/2019  9:28 AM Encounter Date: 9/16/2019 Status: Signed    : Faiza Hernandez MD (Physician)    Addended by: FAIZA HERNANDEZ on: 9/19/2019 09:28 AM        Modules accepted: Level of Service

## 2021-07-21 ENCOUNTER — RECORDS - HEALTHEAST (OUTPATIENT)
Dept: ADMINISTRATIVE | Facility: CLINIC | Age: 66
End: 2021-07-21

## 2021-09-21 ENCOUNTER — OFFICE VISIT (OUTPATIENT)
Dept: FAMILY MEDICINE | Facility: CLINIC | Age: 66
End: 2021-09-21
Payer: COMMERCIAL

## 2021-09-21 ENCOUNTER — MEDICAL CORRESPONDENCE (OUTPATIENT)
Dept: HEALTH INFORMATION MANAGEMENT | Facility: CLINIC | Age: 66
End: 2021-09-21

## 2021-09-21 VITALS
BODY MASS INDEX: 23.35 KG/M2 | HEART RATE: 84 BPM | SYSTOLIC BLOOD PRESSURE: 100 MMHG | WEIGHT: 148 LBS | DIASTOLIC BLOOD PRESSURE: 72 MMHG | OXYGEN SATURATION: 98 %

## 2021-09-21 VITALS
DIASTOLIC BLOOD PRESSURE: 72 MMHG | HEART RATE: 84 BPM | WEIGHT: 148 LBS | OXYGEN SATURATION: 98 % | SYSTOLIC BLOOD PRESSURE: 100 MMHG | HEIGHT: 67 IN | BODY MASS INDEX: 23.23 KG/M2

## 2021-09-21 DIAGNOSIS — M79.89 LEG SWELLING: ICD-10-CM

## 2021-09-21 DIAGNOSIS — Z13.1 SCREENING FOR DIABETES MELLITUS: ICD-10-CM

## 2021-09-21 DIAGNOSIS — Z13.220 LIPID SCREENING: ICD-10-CM

## 2021-09-21 DIAGNOSIS — Z12.11 COLON CANCER SCREENING: ICD-10-CM

## 2021-09-21 DIAGNOSIS — L82.0 INFLAMED SEBORRHEIC KERATOSIS: Primary | ICD-10-CM

## 2021-09-21 DIAGNOSIS — G47.00 INSOMNIA, UNSPECIFIED TYPE: ICD-10-CM

## 2021-09-21 DIAGNOSIS — Z00.00 ENCOUNTER FOR MEDICARE ANNUAL WELLNESS EXAM: Primary | ICD-10-CM

## 2021-09-21 DIAGNOSIS — Z23 IMMUNIZATION DUE: ICD-10-CM

## 2021-09-21 DIAGNOSIS — I83.812 VARICOSE VEINS OF LEFT LOWER EXTREMITY WITH PAIN: ICD-10-CM

## 2021-09-21 LAB
ALBUMIN SERPL-MCNC: 4.2 G/DL (ref 3.5–5)
ALP SERPL-CCNC: 82 U/L (ref 45–120)
ALT SERPL W P-5'-P-CCNC: 17 U/L (ref 0–45)
ANION GAP SERPL CALCULATED.3IONS-SCNC: 11 MMOL/L (ref 5–18)
AST SERPL W P-5'-P-CCNC: 22 U/L (ref 0–40)
BILIRUB SERPL-MCNC: 1 MG/DL (ref 0–1)
BUN SERPL-MCNC: 14 MG/DL (ref 8–22)
CALCIUM SERPL-MCNC: 9.3 MG/DL (ref 8.5–10.5)
CHLORIDE BLD-SCNC: 104 MMOL/L (ref 98–107)
CHOLEST SERPL-MCNC: 230 MG/DL
CO2 SERPL-SCNC: 25 MMOL/L (ref 22–31)
CREAT SERPL-MCNC: 0.77 MG/DL (ref 0.6–1.1)
FASTING STATUS PATIENT QL REPORTED: YES
GFR SERPL CREATININE-BSD FRML MDRD: 80 ML/MIN/1.73M2
GLUCOSE BLD-MCNC: 95 MG/DL (ref 70–125)
HDLC SERPL-MCNC: 87 MG/DL
LDLC SERPL CALC-MCNC: 127 MG/DL
POTASSIUM BLD-SCNC: 4.2 MMOL/L (ref 3.5–5)
PROT SERPL-MCNC: 6.9 G/DL (ref 6–8)
SODIUM SERPL-SCNC: 140 MMOL/L (ref 136–145)
TRIGL SERPL-MCNC: 82 MG/DL
TSH SERPL DL<=0.005 MIU/L-ACNC: 3.6 UIU/ML (ref 0.3–5)

## 2021-09-21 PROCEDURE — 99397 PER PM REEVAL EST PAT 65+ YR: CPT | Performed by: FAMILY MEDICINE

## 2021-09-21 PROCEDURE — 2894A PR DESTRUCT PREMALIGNANT LESION, 2-14: CPT | Performed by: FAMILY MEDICINE

## 2021-09-21 PROCEDURE — 17110 DESTRUCTION B9 LES UP TO 14: CPT | Performed by: FAMILY MEDICINE

## 2021-09-21 PROCEDURE — 84443 ASSAY THYROID STIM HORMONE: CPT | Performed by: FAMILY MEDICINE

## 2021-09-21 PROCEDURE — 80053 COMPREHEN METABOLIC PANEL: CPT | Performed by: FAMILY MEDICINE

## 2021-09-21 PROCEDURE — 36415 COLL VENOUS BLD VENIPUNCTURE: CPT | Performed by: FAMILY MEDICINE

## 2021-09-21 PROCEDURE — 80061 LIPID PANEL: CPT | Performed by: FAMILY MEDICINE

## 2021-09-21 RX ORDER — ESZOPICLONE 1 MG/1
1 TABLET, FILM COATED ORAL AT BEDTIME
Qty: 30 TABLET | Refills: 2 | Status: SHIPPED | OUTPATIENT
Start: 2021-09-21 | End: 2022-11-23

## 2021-09-21 ASSESSMENT — ACTIVITIES OF DAILY LIVING (ADL): CURRENT_FUNCTION: NO ASSISTANCE NEEDED

## 2021-09-21 ASSESSMENT — MIFFLIN-ST. JEOR: SCORE: 1234.98

## 2021-09-21 NOTE — PROGRESS NOTES
"SUBJECTIVE:   Nandini Yang is a 66 year old female who presents for Preventive Visit.    Patient has been advised of split billing requirements and indicates understanding: Yes   Are you in the first 12 months of your Medicare coverage?  No    She has not been sleeping due to some stress with her son. She has done well with Lunesta in the past.     She does wonder if she is retaining water. She has gained about five pounds.     She does wonder about a psychiatrist as well, she is not getting great results with the current one she is seeing.     Healthy Habits:     In general, how would you rate your overall health?  Good    Frequency of exercise:  4-5 days/week    Duration of exercise:  Greater than 60 minutes    Do you usually eat at least 4 servings of fruit and vegetables a day, include whole grains    & fiber and avoid regularly eating high fat or \"junk\" foods?  Yes    Taking medications regularly:  No    Medication side effects:  Other    Ability to successfully perform activities of daily living:  No assistance needed    Home Safety:  No safety concerns identified    Hearing Impairment:  No hearing concerns    In the past 6 months, have you been bothered by leaking of urine?  No    In general, how would you rate your overall mental or emotional health?  Good      PHQ-2 Total Score: 2    Additional concerns today:  Yes    Do you feel safe in your environment? Yes    Have you ever done Advance Care Planning? (For example, a Health Directive, POLST, or a discussion with a medical provider or your loved ones about your wishes): No, advance care planning information given to patient to review.  Patient plans to discuss their wishes with loved ones or provider.       Fall risk     click delete button to remove this line now  Cognitive Screening   1) Repeat 3 items (Leader, Season, Table)    2) Clock draw: NORMAL  3) 3 item recall: Recalls 3 objects  Results: 3 items recalled: COGNITIVE IMPAIRMENT LESS " LIKELY    Mini-CogTM Copyright JUANJOSE Garduno. Licensed by the author for use in [facility]; reprinted with permission (linda@[facility]). All rights reserved.      Do you have sleep apnea, excessive snoring or daytime drowsiness?: no    Reviewed and updated as needed this visit by clinical staff  Tobacco  Allergies  Meds  Problems  Med Hx  Surg Hx  Fam Hx  Soc Hx          Reviewed and updated as needed this visit by Provider  Tobacco  Allergies  Meds  Problems  Med Hx  Surg Hx  Fam Hx         Social History     Tobacco Use     Smoking status: Never Smoker     Smokeless tobacco: Never Used   Substance Use Topics     Alcohol use: Not on file       Alcohol Use 9/21/2021   Prescreen: >3 drinks/day or >7 drinks/week? Not Applicable       Insomnia      Duration: several weeks    Description  Frequency of insomnia:  nightly  Time to fall asleep: Unsure    Accompanying signs and symptoms:  excessive daytime sleepiness and fatigue    History  Similar episodes in past:  YES  Previous evaluation/sleep study:  no     Precipitating or alleviating factors:  New stressful situation: YES  Caffeine intake after lunchtime: no   OTC decongestants: no   Any new medications: no     Therapies tried and outcome: caffeine avoidance      Current providers sharing in care for this patient include:   Patient Care Team:  Faiza Myles MD as PCP - General (Family Medicine)  Cade Esquivel MD as Assigned Heart and Vascular Provider  Faiza Myles MD as Assigned PCP    The following health maintenance items are reviewed in Epic and correct as of today:  Health Maintenance Due   Topic Date Due     ZOSTER IMMUNIZATION (2 of 3) 12/15/2015     INFLUENZA VACCINE (1) Never done     FALL RISK ASSESSMENT  09/18/2021       Any new diagnosis of family breast, ovarian, or bowel cancer? No    FHS-7: No flowsheet data found.    Getting done on 10/1/21  Pertinent mammograms are reviewed under the imaging tab.    Review  "of Systems  With the exception of the aforementioned issues, 12 point, comprehensive ROS was done and was negative.     OBJECTIVE:   /72   Pulse 84   Ht 1.695 m (5' 6.75\")   Wt 67.1 kg (148 lb)   LMP  (LMP Unknown)   SpO2 98%   Breastfeeding Unknown   BMI 23.35 kg/m   Estimated body mass index is 23.35 kg/m  as calculated from the following:    Height as of this encounter: 1.695 m (5' 6.75\").    Weight as of this encounter: 67.1 kg (148 lb).  Physical Exam  GENERAL APPEARANCE: healthy, alert and no distress  EYES: Eyes grossly normal to inspection, PERRL and conjunctivae and sclerae normal  HENT: ear canals and TM's normal, nose and mouth without ulcers or lesions, oropharynx clear and oral mucous membranes moist  NECK: no adenopathy, no asymmetry, masses, or scars and thyroid normal to palpation  RESP: lungs clear to auscultation - no rales, rhonchi or wheezes  BREAST: normal without masses, tenderness or nipple discharge and no palpable axillary masses or adenopathy  CV: regular rate and rhythm, normal S1 S2, no S3 or S4, no murmur, click or rub, no peripheral edema and peripheral pulses strong  ABDOMEN: soft, nontender, no hepatosplenomegaly, no masses and bowel sounds normal  MS: no musculoskeletal defects are noted, gait is age appropriate without ataxia, she does have bilateral enlargement of her lower extremities that does not seem to be pitting edema, she does have varicose veins bilaterally  SKIN: no suspicious lesions or rashes and seborrheic keratoses scattered, especially on back  NEURO: Normal strength and tone, sensory exam grossly normal, mentation intact and speech normal  PSYCH: mentation appears normal and affect normal/bright    Diagnostic Test Results:  Labs reviewed in Epic    ASSESSMENT / PLAN:   Nandini was seen today for wellness visit.    Diagnoses and all orders for this visit:    Encounter for Medicare annual wellness exam   Routine health maintenance discussion:  No smoking, " "limited alcohol (7 or less servings per week), 5 fruits/veg servings per day, 200 minutes of exercise per week.  Daily calcium/vitamin D guidelines, bone health, colon cancer screening beginning at age 50.  Accident avoidance, sun screen.    See below for education discussed    Insomnia, unspecified type  -     eszopiclone (LUNESTA) 1 MG tablet; Take 1 tablet (1 mg) by mouth At Bedtime   Has done well with Lunesta in the past, will update today and if doing well f/u in one year. If she needs further refills will need to be seen in six months.     Leg swelling  -     TSH with free T4 reflex; Future   -I suspect this is from her varicose veins as there is not pitting edema but will check her thyroid to r/o any pretibial myxedema.     Varicose veins of left lower extremity with pain   Elevate as needed    Immunization due  -     Cancel: INFLUENZA, QUAD, HIGH DOSE, PF, 65YR + (FLUZONE HD)    Screening for diabetes mellitus  -     Comprehensive metabolic panel (BMP + Alb, Alk Phos, ALT, AST, Total. Bili, TP); Future    Lipid screening  -     Lipid panel reflex to direct LDL Fasting; Future    Colon cancer screening  -     COLOGUARD(EXACT SCIENCES)    Other orders  -     REVIEW OF HEALTH MAINTENANCE PROTOCOL ORDERS      Patient has been advised of split billing requirements and indicates understanding: Yes  COUNSELING:  Reviewed preventive health counseling, as reflected in patient instructions       Regular exercise       Healthy diet/nutrition       Dental care       Bladder control       Osteoporosis prevention/bone health       Colon cancer screening       Advanced Planning     Estimated body mass index is 23.35 kg/m  as calculated from the following:    Height as of this encounter: 1.695 m (5' 6.75\").    Weight as of this encounter: 67.1 kg (148 lb).        She reports that she has never smoked. She has never used smokeless tobacco.      Appropriate preventive services were discussed with this patient, including " applicable screening as appropriate for cardiovascular disease, diabetes, osteopenia/osteoporosis, and glaucoma.  As appropriate for age/gender, discussed screening for colorectal cancer, prostate cancer, breast cancer, and cervical cancer. Checklist reviewing preventive services available has been given to the patient.    Reviewed patients plan of care and provided an AVS. The Basic Care Plan (routine screening as documented in Health Maintenance) for Nandini meets the Care Plan requirement. This Care Plan has been established and reviewed with the Patient.    Counseling Resources:  ATP IV Guidelines  Pooled Cohorts Equation Calculator  Breast Cancer Risk Calculator  Breast Cancer: Medication to Reduce Risk  FRAX Risk Assessment  ICSI Preventive Guidelines  Dietary Guidelines for Americans, 2010  USDA's MyPlate  ASA Prophylaxis  Lung CA Screening    Faiza Myles MD  [facility]    Identified Health Risks:

## 2021-09-21 NOTE — PROGRESS NOTES
SUBJECTIVE: Patient comes in today for irritated seborrheic keratoses.  She has a longstanding history of these and they do get quite itchy to her, so she scratches them they get irritated.  She has done well with cryotherapy previously and would like to get them frozen again today.    Exam reveals numerous seborrheic keratoses especially on back and torso, 4 on the back however 2 in the right arm and 2 on the neck are slightly more irritated given location  Diagnosis: Numerous seborrheic keratoses  -Skin exam done as noted above.    Procedure: Cryotherapy  Diagnosis: Seborrheic Keratoses  Location: neck, back and lower arms  Specimen number: 8  Lesion size: 1-2 mm  Total size: na  Discussion of treatment options, all of patient's questions answered. After informed consent, patient elects to proceed with procedure. Cryotherapy with LN2 with 2 mm margins and 3 second thaw cycle. Cryotherapy performed in triplicate. Wound care instructions given. Follow up with any wound problems, poor healing, or signs of infection.    Disposition & Follow-up: Return if symptoms worsen or fail to improve, for if symptoms worsen or fail to improve..   Primary and secondary prevention of skin cancer were reviewed. , Patient was advised to call with any new or changing lesions.

## 2021-09-21 NOTE — PATIENT INSTRUCTIONS
Patient Education   Personalized Prevention Plan  You are due for the preventive services outlined below.  Your care team is available to assist you in scheduling these services.  If you have already completed any of these items, please share that information with your care team to update in your medical record.  Health Maintenance Due   Topic Date Due     ANNUAL REVIEW OF HM ORDERS  Never done     Zoster (Shingles) Vaccine (2 of 3) 12/15/2015     Flu Vaccine (1) Never done     FALL RISK ASSESSMENT  09/18/2021

## 2021-09-21 NOTE — LETTER
September 23, 2021      Nandini Yang  [address] [address] [facility] [address]        Dear ,    We are writing to inform you of your test results.    Your cholesterol continues to be great, your thyroid is normal as are your electrolytes, kidneys and liver tests.        Resulted Orders   Lipid panel reflex to direct LDL Fasting   Result Value Ref Range    Cholesterol 230 (H) <=199 mg/dL    Triglycerides 82 <=149 mg/dL    Direct Measure HDL 87 >=50 mg/dL      Comment:      HDL Cholesterol Reference Range:     0-2 years:   No reference ranges established for patients under 2 years old  at [facility] for lipid analytes.    2-8 years:  Greater than 45 mg/dL     18 years and older:   Female: Greater than or equal to 50 mg/dL   Male:   Greater than or equal to 40 mg/dL    LDL Cholesterol Calculated 127 <=129 mg/dL    Patient Fasting > 8hrs? Yes    Comprehensive metabolic panel (BMP + Alb, Alk Phos, ALT, AST, Total. Bili, TP)   Result Value Ref Range    Sodium 140 136 - 145 mmol/L    Potassium 4.2 3.5 - 5.0 mmol/L    Chloride 104 98 - 107 mmol/L    Carbon Dioxide (CO2) 25 22 - 31 mmol/L    Anion Gap 11 5 - 18 mmol/L    Urea Nitrogen 14 8 - 22 mg/dL    Creatinine 0.77 0.60 - 1.10 mg/dL    Calcium 9.3 8.5 - 10.5 mg/dL    Glucose 95 70 - 125 mg/dL    Alkaline Phosphatase 82 45 - 120 U/L    AST 22 0 - 40 U/L    ALT 17 0 - 45 U/L    Protein Total 6.9 6.0 - 8.0 g/dL    Albumin 4.2 3.5 - 5.0 g/dL    Bilirubin Total 1.0 0.0 - 1.0 mg/dL    GFR Estimate 80 >60 mL/min/1.73m2      Comment:      As of July 11, 2021, eGFR is calculated by the CKD-EPI creatinine equation, without race adjustment. eGFR can be influenced by muscle mass, exercise, and diet. The reported eGFR is an estimation only and is only applicable if the renal function is stable.   TSH with free T4 reflex   Result Value Ref Range    TSH 3.60 0.30 - 5.00 uIU/mL       If you have any questions or concerns, please call the clinic at the number  listed above.       Sincerely,      Faiza Myles MD

## 2021-09-23 PROBLEM — I83.812 VARICOSE VEINS OF LEFT LOWER EXTREMITY WITH PAIN: Status: ACTIVE | Noted: 2021-09-23

## 2021-09-23 PROBLEM — I83.223: Status: ACTIVE | Noted: 2021-09-23

## 2021-10-01 ENCOUNTER — TELEPHONE (OUTPATIENT)
Dept: FAMILY MEDICINE | Facility: CLINIC | Age: 66
End: 2021-10-01

## 2021-10-01 NOTE — TELEPHONE ENCOUNTER
Reason for Call:  Other call back    Detailed comments: pt calling and she needs info faxed to [facility].She brought in the form. amd on the top of the form, it has what she needs to be filled out  7 individual items need to be filled out.  The fax # is on the form.    Phone Number Patient can be reached at: Cell number on file:    Telephone Information:   Mobile [phone]       Best Time:     Can we leave a detailed message on this number? YES    Call taken on 10/1/2021 at 11:13 AM by Angelika Nieves

## 2021-10-01 NOTE — TELEPHONE ENCOUNTER
Do you have this form? I looked in the to be faxed pile and did not see, I also looked in the stack that you need to address yet? Please advise, thanks

## 2021-10-02 ENCOUNTER — HEALTH MAINTENANCE LETTER (OUTPATIENT)
Age: 66
End: 2021-10-02

## 2021-10-04 ENCOUNTER — TRANSFERRED RECORDS (OUTPATIENT)
Dept: HEALTH INFORMATION MANAGEMENT | Facility: CLINIC | Age: 66
End: 2021-10-04

## 2021-10-05 NOTE — TELEPHONE ENCOUNTER
[facility] form FOUND. It does however require a signature from the patient at the top that has not been signed. Left voicemail and sent [employer] message explaining this to patient. Form placed in yellow outguide and placed in Dr. Tito guardado until we hear what patient would prefer be done with form.     More Larsen, [facility]

## 2021-10-05 NOTE — TELEPHONE ENCOUNTER
Patient contacted- apologized that we don't have this form.   New form needed. She will bring to clinic tomorrow and drop of an extra blank copy. Will call her once it's completed.

## 2021-10-05 NOTE — TELEPHONE ENCOUNTER
I was contacted by call center who informed me that Nandini had called back and would like the form put up front at the  so that she can come in and sign it tomorrow and then we can fax it for her.     More Larsen, CMA

## 2021-10-12 ENCOUNTER — OFFICE VISIT (OUTPATIENT)
Dept: VASCULAR SURGERY | Facility: CLINIC | Age: 66
End: 2021-10-12

## 2021-10-12 DIAGNOSIS — I78.1 SPIDER TELANGIECTASIS OF SKIN: Primary | ICD-10-CM

## 2021-10-12 PROCEDURE — 36468 NJX SCLRSNT SPIDER VEINS: CPT | Performed by: SURGERY

## 2021-10-12 PROCEDURE — S9999 SALES TAX: HCPCS | Performed by: SURGERY

## 2021-10-12 NOTE — PROGRESS NOTES
Vein Clinic Sclerotherapy Note     Indications:  Bilateral lower extremity spider telangiectasias of cosmetic concern     Procedure:  Bilateral lower extremity cosmetic sclerotherapy     Procedure description  Details of procedure including risks of allergic reaction, deep vein thrombosis, ulceration, superficial thrombophlebitis and hyperpigmentation were discussed.  The patient voiced understanding and wished to proceed.  Informed consent was obtained.    I donned the Syris headlight and injected telangiectasias which were most concerning for her over the proximal legs bilaterally, reticular veins on the left infrapatellar area and telangiectasias over the anterolateral right leg and right medial calf.    I was pleased that the area of matting in the right popliteal fossa had essentially resolved with only a few telangiectasias remaining.    Telangiectasias in the a forementioned areas were injected using 0.5% polidocanol foam (2 parts air to 1 part polidocanol).  I used a total of 6 mL of foam treating both anterior and posterior legs.  I had the patient perform ankle pumps upon completion of the sclerotherapy.    We cleaned her legs, had her don thigh-high compression will have her return on an as-needed basis.  She tolerated procedure well but evidence of allergic extra complications.    Sclerotherapy    Date/Time: 10/12/2021 11:23 AM  Performed by: Cade Esquivel MD  Authorized by: Cade Esquivel MD     Time out: Immediately prior to the procedure a time out was called    Type:  Cosmetic  Session:  Limited  Procedure side:  Bilateral  Solution/Amount:  .5 POLIDOCANOL  Syringes::  2  Patient tolerance:  Patient tolerated the procedure well with no immediate complications  Wrap/Hose:  Chuck Esquivel MD    Dictated using Dragon voice recognition software which may result in transcription errors

## 2021-10-12 NOTE — LETTER
10/12/2021         RE: Nandini Yang  [address] [facility] [address]        Dear Colleague,    Thank you for referring your patient, Nandini Yang, to the [facility] [place]. Please see a copy of my visit note below.        Vein Clinic Sclerotherapy Note     Indications:  Bilateral lower extremity spider telangiectasias of cosmetic concern     Procedure:  Bilateral lower extremity cosmetic sclerotherapy     Procedure description  Details of procedure including risks of allergic reaction, deep vein thrombosis, ulceration, superficial thrombophlebitis and hyperpigmentation were discussed.  The patient voiced understanding and wished to proceed.  Informed consent was obtained.    I donned the Syris headlight and injected telangiectasias which were most concerning for her over the proximal legs bilaterally, reticular veins on the left infrapatellar area and telangiectasias over the anterolateral right leg and right medial calf.    I was pleased that the area of matting in the right popliteal fossa had essentially resolved with only a few telangiectasias remaining.    Telangiectasias in the a forementioned areas were injected using 0.5% polidocanol foam (2 parts air to 1 part polidocanol).  I used a total of 6 mL of foam treating both anterior and posterior legs.  I had the patient perform ankle pumps upon completion of the sclerotherapy.    We cleaned her legs, had her don thigh-high compression will have her return on an as-needed basis.  She tolerated procedure well but evidence of allergic extra complications.    Sclerotherapy    Date/Time: 10/12/2021 11:23 AM  Performed by: Cade Esquivel MD  Authorized by: Cade Esquivel MD     Time out: Immediately prior to the procedure a time out was called    Type:  Cosmetic  Session:  Limited  Procedure side:  Bilateral  Solution/Amount:  .5 POLIDOCANOL  Syringes::  2  Patient tolerance:  Patient tolerated the procedure  well with no immediate complications  Wrap/Hose:  Chuck Esquivel MD    Dictated using Dragon voice recognition software which may result in transcription errors      Again, thank you for allowing me to participate in the care of your patient.        Sincerely,        Cade Esquivel MD

## 2021-12-29 LAB — COLOGUARD-ABSTRACT: NEGATIVE

## 2022-01-18 ENCOUNTER — OFFICE VISIT (OUTPATIENT)
Dept: VASCULAR SURGERY | Facility: CLINIC | Age: 67
End: 2022-01-18

## 2022-01-18 DIAGNOSIS — I78.1 SPIDER TELANGIECTASIS OF SKIN: Primary | ICD-10-CM

## 2022-01-18 PROCEDURE — S9999 SALES TAX: HCPCS | Performed by: SURGERY

## 2022-01-18 PROCEDURE — 36468 NJX SCLRSNT SPIDER VEINS: CPT | Performed by: SURGERY

## 2022-01-18 NOTE — PROGRESS NOTES
Vein Clinic Sclerotherapy Note     Indications:  Residual bilateral lower extremity spider telangiectasias of cosmetic concern     Procedure:  Bilateral extremity cosmetic sclerotherapy     Procedure description  Details of procedure including risks of allergic reaction, deep vein thrombosis, ulceration, superficial thrombophlebitis and hyperpigmentation were discussed.  The patient voiced understanding and wished to proceed.  Informed consent was obtained.    Telangiectasias were noted over the proximal third of the posterolateral  right calf extending somewhat posteriorly and along the left medial calf.  A few are noted on the left posterior lateral calf as well.    I donned the Syris headlight and injected these red telangiectasias with 0.5% polidocanol foam.  Used a total of 6 mL (2 syringes) of foam.  The patient will return in approximate 6 weeks in follow-up.    She tolerated procedure well but after retraction of complications.  We had to perform a partial table, then had her walk for 10 minutes.    Sclerotherapy    Date/Time: 1/18/2022 4:21 PM  Performed by: Cade Esquivel MD  Authorized by: Cade Esquivel MD     Time out: Immediately prior to the procedure a time out was called    Type:  Cosmetic  Session:  Limited  Procedure side:  Bilateral  Solution/Amount:  .5 POLIDOCANOL  Syringes::  2  Patient tolerance:  Patient tolerated the procedure well with no immediate complications  Wrap/Hose:  Chuck Esquivel MD    Dictated using Dragon voice recognition software which may result in transcription errors

## 2022-01-18 NOTE — LETTER
1/18/2022         RE: Nandini Yang  [address] [facility] [address]        Dear Colleague,    Thank you for referring your patient, Nandini Yang, to the [facility] [place]. Please see a copy of my visit note below.        Vein Clinic Sclerotherapy Note     Indications:  Residual bilateral lower extremity spider telangiectasias of cosmetic concern     Procedure:  Bilateral extremity cosmetic sclerotherapy     Procedure description  Details of procedure including risks of allergic reaction, deep vein thrombosis, ulceration, superficial thrombophlebitis and hyperpigmentation were discussed.  The patient voiced understanding and wished to proceed.  Informed consent was obtained.    Telangiectasias were noted over the proximal third of the posterolateral  right calf extending somewhat posteriorly and along the left medial calf.  A few are noted on the left posterior lateral calf as well.    I donned the Syris headlight and injected these red telangiectasias with 0.5% polidocanol foam.  Used a total of 6 mL (2 syringes) of foam.  The patient will return in approximate 6 weeks in follow-up.    She tolerated procedure well but after retraction of complications.  We had to perform a partial table, then had her walk for 10 minutes.    Sclerotherapy    Date/Time: 1/18/2022 4:21 PM  Performed by: Cade Esquivel MD  Authorized by: Cade Esquivel MD     Time out: Immediately prior to the procedure a time out was called    Type:  Cosmetic  Session:  Limited  Procedure side:  Bilateral  Solution/Amount:  .5 POLIDOCANOL  Syringes::  2  Patient tolerance:  Patient tolerated the procedure well with no immediate complications  Wrap/Hose:  Chuck Esquivel MD    Dictated using Dragon voice recognition software which may result in transcription errors      Again, thank you for allowing me to participate in the care of your patient.        Sincerely,        Cade  Rebecca Esquivel MD

## 2022-05-12 ENCOUNTER — TELEPHONE (OUTPATIENT)
Dept: FAMILY MEDICINE | Facility: CLINIC | Age: 67
End: 2022-05-12
Payer: COMMERCIAL

## 2022-05-12 DIAGNOSIS — Z23 IMMUNIZATION DUE: Primary | ICD-10-CM

## 2022-05-12 NOTE — TELEPHONE ENCOUNTER
Pt's daughter is having a baby soon and she is wanting to get the Tdap vaccine for the pertussis protection prior. Last one was in 2005.     Please place order if appropriate, pt is scheduled on the MA/LPN schedule at the [facility] on 5/19.

## 2022-09-21 DIAGNOSIS — Z13.1 SCREENING FOR DIABETES MELLITUS: ICD-10-CM

## 2022-09-21 DIAGNOSIS — Z13.220 LIPID SCREENING: Primary | ICD-10-CM

## 2022-09-26 ENCOUNTER — TELEPHONE (OUTPATIENT)
Dept: FAMILY MEDICINE | Facility: CLINIC | Age: 67
End: 2022-09-26

## 2022-09-26 DIAGNOSIS — F41.1 GENERALIZED ANXIETY DISORDER: Primary | ICD-10-CM

## 2022-09-26 NOTE — TELEPHONE ENCOUNTER
Order/Referral Request    Who is requesting: Patient     Orders being requested: Psychiatrist. Looking to see Dr. Peña Sawyer with [facility].    Reason service is needed/diagnosis: Mental Health     When are orders needed by: As soon as possible. There is a waiting list to see provider. Patient's current psychiatrist is leaving     Has this been discussed with Provider: No    Does patient have a preference on a Group/Provider/Facility? [facility]     Does patient have an appointment scheduled?: No    Where to send orders: N/A    Could we send this information to you in [facility] or would you prefer to receive a phone call?:   Patient would prefer a phone call   Okay to leave a detailed message?: Yes at Cell number on file:    Telephone Information:   Mobile [phone]

## 2022-09-26 NOTE — TELEPHONE ENCOUNTER
Referral is placed, they should call to get her scheduled. I'm not sure how far out he is though. She should talk with her psychiatrist to see what they recommend for care between when they are leaving and when she can be seen.

## 2022-09-28 ENCOUNTER — TELEPHONE (OUTPATIENT)
Dept: FAMILY MEDICINE | Facility: CLINIC | Age: 67
End: 2022-09-28

## 2022-09-28 ENCOUNTER — LAB (OUTPATIENT)
Dept: LAB | Facility: CLINIC | Age: 67
End: 2022-09-28
Payer: COMMERCIAL

## 2022-09-28 DIAGNOSIS — Z13.1 SCREENING FOR DIABETES MELLITUS: ICD-10-CM

## 2022-09-28 DIAGNOSIS — Z13.220 LIPID SCREENING: ICD-10-CM

## 2022-09-28 LAB
ALBUMIN SERPL BCG-MCNC: 4.3 G/DL (ref 3.5–5.2)
ALP SERPL-CCNC: 71 U/L (ref 35–104)
ALT SERPL W P-5'-P-CCNC: 27 U/L (ref 10–35)
ANION GAP SERPL CALCULATED.3IONS-SCNC: 11 MMOL/L (ref 7–15)
AST SERPL W P-5'-P-CCNC: 29 U/L (ref 10–35)
BILIRUB SERPL-MCNC: 0.8 MG/DL
BUN SERPL-MCNC: 13.1 MG/DL (ref 8–23)
CALCIUM SERPL-MCNC: 9.4 MG/DL (ref 8.8–10.2)
CHLORIDE SERPL-SCNC: 105 MMOL/L (ref 98–107)
CHOLEST SERPL-MCNC: 239 MG/DL
CREAT SERPL-MCNC: 0.79 MG/DL (ref 0.51–0.95)
DEPRECATED HCO3 PLAS-SCNC: 25 MMOL/L (ref 22–29)
GFR SERPL CREATININE-BSD FRML MDRD: 81 ML/MIN/1.73M2
GLUCOSE SERPL-MCNC: 99 MG/DL (ref 70–99)
HDLC SERPL-MCNC: 85 MG/DL
LDLC SERPL CALC-MCNC: 134 MG/DL
NONHDLC SERPL-MCNC: 154 MG/DL
POTASSIUM SERPL-SCNC: 4.6 MMOL/L (ref 3.4–5.3)
PROT SERPL-MCNC: 6.7 G/DL (ref 6.4–8.3)
SODIUM SERPL-SCNC: 141 MMOL/L (ref 136–145)
TRIGL SERPL-MCNC: 100 MG/DL

## 2022-09-28 PROCEDURE — 80061 LIPID PANEL: CPT

## 2022-09-28 PROCEDURE — 36415 COLL VENOUS BLD VENIPUNCTURE: CPT

## 2022-09-28 PROCEDURE — 80053 COMPREHEN METABOLIC PANEL: CPT

## 2022-09-28 NOTE — TELEPHONE ENCOUNTER
Patient came in for labs and had physician results form for her work place insurance that needed to be filled out. I assisted patient in getting her vitals and filled out form. Awaiting lab results and physicians signature (in pcp's ib) and then will fax to number provided on form and scan to patient's chart. Call and let patient know when task is completed.     More Larsen, CMA

## 2022-09-30 NOTE — TELEPHONE ENCOUNTER
Called and left voicemail for patient informing her that she needs to sign this form and then we can fax it for her. I will place up front at clinic and she can inform the ladies at the  that she'd like it faxed once it's signed.     More Larsen, CMA

## 2022-09-30 NOTE — TELEPHONE ENCOUNTER
Pt called back stated she received message that she needed to come into sign form and she can do on Monday, but we will still need to fax for her.    She also wants to talk about a change from Seroquel due to cholesterol.She just had last appt with her psych and he does not want to make any changes at this time and that she should check with Dr Myles.   She has not heard from new psychiatrist yet either.     She does not want to go on paxil but wants to know if other options?

## 2022-10-05 ENCOUNTER — TRANSFERRED RECORDS (OUTPATIENT)
Dept: HEALTH INFORMATION MANAGEMENT | Facility: CLINIC | Age: 67
End: 2022-10-05

## 2022-11-18 ASSESSMENT — ACTIVITIES OF DAILY LIVING (ADL): CURRENT_FUNCTION: NO ASSISTANCE NEEDED

## 2022-11-18 ASSESSMENT — ENCOUNTER SYMPTOMS
HEMATURIA: 0
DIZZINESS: 0
MYALGIAS: 0
DIARRHEA: 0
WEAKNESS: 0
SHORTNESS OF BREATH: 0
ARTHRALGIAS: 0
FREQUENCY: 0
HEARTBURN: 0
HEMATOCHEZIA: 0
BREAST MASS: 0
FEVER: 0
NAUSEA: 0
NERVOUS/ANXIOUS: 1
CONSTIPATION: 1
HEADACHES: 0
PARESTHESIAS: 0
COUGH: 0
EYE PAIN: 1
PALPITATIONS: 0
JOINT SWELLING: 0
SORE THROAT: 0
CHILLS: 0
DYSURIA: 0
ABDOMINAL PAIN: 0

## 2022-11-23 ENCOUNTER — OFFICE VISIT (OUTPATIENT)
Dept: FAMILY MEDICINE | Facility: CLINIC | Age: 67
End: 2022-11-23
Payer: COMMERCIAL

## 2022-11-23 VITALS
HEART RATE: 79 BPM | RESPIRATION RATE: 14 BRPM | HEIGHT: 67 IN | TEMPERATURE: 97.9 F | WEIGHT: 151 LBS | SYSTOLIC BLOOD PRESSURE: 92 MMHG | DIASTOLIC BLOOD PRESSURE: 70 MMHG | OXYGEN SATURATION: 97 % | BODY MASS INDEX: 23.7 KG/M2

## 2022-11-23 DIAGNOSIS — Z78.0 ASYMPTOMATIC MENOPAUSAL STATE: ICD-10-CM

## 2022-11-23 DIAGNOSIS — Z13.1 SCREENING FOR DIABETES MELLITUS: ICD-10-CM

## 2022-11-23 DIAGNOSIS — Z00.00 ENCOUNTER FOR MEDICARE ANNUAL WELLNESS EXAM: Primary | ICD-10-CM

## 2022-11-23 DIAGNOSIS — G47.00 INSOMNIA, UNSPECIFIED TYPE: ICD-10-CM

## 2022-11-23 DIAGNOSIS — F41.1 GENERALIZED ANXIETY DISORDER: ICD-10-CM

## 2022-11-23 DIAGNOSIS — Z13.220 LIPID SCREENING: ICD-10-CM

## 2022-11-23 PROCEDURE — 99397 PER PM REEVAL EST PAT 65+ YR: CPT | Performed by: FAMILY MEDICINE

## 2022-11-23 PROCEDURE — 99214 OFFICE O/P EST MOD 30 MIN: CPT | Mod: 25 | Performed by: FAMILY MEDICINE

## 2022-11-23 RX ORDER — TRAZODONE HYDROCHLORIDE 50 MG/1
TABLET, FILM COATED ORAL
Qty: 60 TABLET | Refills: 5 | Status: SHIPPED | OUTPATIENT
Start: 2022-11-23 | End: 2023-03-01

## 2022-11-23 RX ORDER — ESZOPICLONE 1 MG/1
1 TABLET, FILM COATED ORAL AT BEDTIME
Qty: 30 TABLET | Refills: 2 | Status: SHIPPED | OUTPATIENT
Start: 2022-11-23 | End: 2023-03-01

## 2022-11-23 RX ORDER — CLONAZEPAM 1 MG/1
1 TABLET ORAL PRN
Qty: 10 TABLET | Refills: 1 | Status: SHIPPED | OUTPATIENT
Start: 2022-11-23 | End: 2023-03-01

## 2022-11-23 RX ORDER — QUETIAPINE FUMARATE 50 MG/1
TABLET, EXTENDED RELEASE ORAL
Qty: 42 TABLET | Refills: 0 | Status: SHIPPED | OUTPATIENT
Start: 2022-11-23 | End: 2023-03-01

## 2022-11-23 ASSESSMENT — ENCOUNTER SYMPTOMS
EYE PAIN: 1
DYSURIA: 0
HEADACHES: 0
ABDOMINAL PAIN: 0
MYALGIAS: 0
CONSTIPATION: 1
WEAKNESS: 0
NAUSEA: 0
HEMATOCHEZIA: 0
BREAST MASS: 0
FREQUENCY: 0
HEARTBURN: 0
DIARRHEA: 0
PARESTHESIAS: 0
COUGH: 0
SHORTNESS OF BREATH: 0
NERVOUS/ANXIOUS: 1
SORE THROAT: 0
ARTHRALGIAS: 0
PALPITATIONS: 0
JOINT SWELLING: 0
FEVER: 0
HEMATURIA: 0
CHILLS: 0
DIZZINESS: 0

## 2022-11-23 ASSESSMENT — ACTIVITIES OF DAILY LIVING (ADL): CURRENT_FUNCTION: NO ASSISTANCE NEEDED

## 2022-11-23 NOTE — PATIENT INSTRUCTIONS
I'm going to change your prescription for Seroquel. You will wean down on this with a goal of stopping in one month. When you are down to one pill daily of the new prescription of Seroquel you can take one trazodone for sleeping otherwise you can start the new Trazodone when you are off the Seroquel which would be best.       Patient Education   Personalized Prevention Plan  You are due for the preventive services outlined below.  Your care team is available to assist you in scheduling these services.  If you have already completed any of these items, please share that information with your care team to update in your medical record.  Health Maintenance Due   Topic Date Due    Zoster (Shingles) Vaccine (2 of 3) 12/15/2015    COVID-19 Vaccine (3 - Booster for Pfizer series) 07/13/2021    Flu Vaccine (1) Never done    Annual Wellness Visit  09/21/2022    ANNUAL REVIEW OF HM ORDERS  09/21/2022

## 2022-11-23 NOTE — PROGRESS NOTES
"SUBJECTIVE:   Nandini is a 67 year old who presents for Preventive Visit.    Are you in the first 12 months of your Medicare coverage?  No    Healthy Habits:     In general, how would you rate your overall health?  Good    Frequency of exercise:  4-5 days/week    Duration of exercise:  45-60 minutes    Do you usually eat at least 4 servings of fruit and vegetables a day, include whole grains    & fiber and avoid regularly eating high fat or \"junk\" foods?  Yes    Medication side effects:  Not applicable    Ability to successfully perform activities of daily living:  No assistance needed    Home Safety:  No safety concerns identified    Hearing Impairment:  No hearing concerns    In the past 6 months, have you been bothered by leaking of urine?  No    In general, how would you rate your overall mental or emotional health?  Good      PHQ-2 Total Score: 2    She comes in today for an AWV. She does now have 8 grandchildren!     Generally is doing well but notes that her cholesterol has been elevated and given this would like to trial getting off of seroquel and onto an alternate medication. The pharmacist recommended considering Trazodone.     She would like to have a refill of the clonazepam as well, uses this sparingly with social situations.     Lastly she does use sparing doses of Lunesta when she can't sleep.     She is working with the eye clinic for dry eye.     She does at times feel like she has to go to the bathroom quite a bit. She does feel that at times she has exacerbations with increased stress. She does get up at night one time as well. She did have surgery four years ago for this and that did help some.     The 10-year ASCVD risk score (Steve MEIER, et al., 2019) is: 3.9%    Values used to calculate the score:      Age: 67 years      Sex: Female      Is Non- : No      Diabetic: No      Tobacco smoker: No      Systolic Blood Pressure: 92 mmHg      Is BP treated: No      HDL " Cholesterol: 85 mg/dL      Total Cholesterol: 239 mg/dL       Have you ever done Advance Care Planning? (For example, a Health Directive, POLST, or a discussion with a medical provider or your loved ones about your wishes): Yes, patient states has an Advance Care Planning document and will bring a copy to the clinic.     Fall risk  Fallen 2 or more times in the past year?: No  Any fall with injury in the past year?: No    Cognitive Screening   1) Repeat 3 items (Leader, Season, Table)    2) Clock draw: NORMAL  3) 3 item recall: Recalls 3 objects  Results: 3 items recalled: COGNITIVE IMPAIRMENT LESS LIKELY    Mini-CogTM Copyright S Laureen. Licensed by the author for use in [facility]; reprinted with permission (linda@[facility]). All rights reserved.    Reviewed and updated as needed this visit by clinical staff   Tobacco  Allergies  Meds  Problems  Med Hx  Surg Hx  Fam Hx          Reviewed and updated as needed this visit by Provider   Tobacco  Allergies  Meds  Problems  Med Hx  Surg Hx  Fam Hx         Social History     Tobacco Use     Smoking status: Never     Smokeless tobacco: Never   Substance Use Topics     Alcohol use: Not on file     Alcohol Use 11/18/2022   Prescreen: >3 drinks/day or >7 drinks/week? Not Applicable       Current providers sharing in care for this patient include:   Patient Care Team:  Faiza Myles MD as PCP - General (Family Medicine)  Cade Esquivel MD as Assigned Heart and Vascular Provider  Faiza Myles MD as Assigned PCP    The following health maintenance items are reviewed in Epic and correct as of today:  Health Maintenance   Topic Date Due     ZOSTER IMMUNIZATION (2 of 3) 12/15/2015     COVID-19 Vaccine (3 - Booster for Pfizer series) 07/13/2021     INFLUENZA VACCINE (1) Never done     MEDICARE ANNUAL WELLNESS VISIT  09/21/2022     ANNUAL REVIEW OF HM ORDERS  11/23/2023     FALL RISK ASSESSMENT  11/23/2023     MAMMO SCREENING   "10/05/2024     COLORECTAL CANCER SCREENING  12/29/2024     DTAP/TDAP/TD IMMUNIZATION (3 - Td or Tdap) 11/11/2025     LIPID  09/28/2027     ADVANCE CARE PLANNING  11/23/2027     DEXA  10/28/2034     PHQ-2 (once per calendar year)  Completed     Pneumococcal Vaccine: 65+ Years  Completed     IPV IMMUNIZATION  Aged Out     MENINGITIS IMMUNIZATION  Aged Out     HEPATITIS C SCREENING  Discontinued       Breast CA Risk Assessment (FHS-7) 11/18/2022   Do you have a family history of breast, colon, or ovarian cancer? No / Unknown       Mammogram Screening: Recommended mammography every 1-2 years with patient discussion and risk factor consideration  Pertinent mammograms are reviewed under the imaging tab.    Review of Systems   Constitutional: Negative for chills and fever.   HENT: Negative for congestion, ear pain, hearing loss and sore throat.    Eyes: Positive for pain. Negative for visual disturbance.   Respiratory: Negative for cough and shortness of breath.    Cardiovascular: Negative for chest pain, palpitations and peripheral edema.   Gastrointestinal: Positive for constipation. Negative for abdominal pain, diarrhea, heartburn, hematochezia and nausea.   Breasts:  Negative for tenderness, breast mass and discharge.   Genitourinary: Negative for dysuria, frequency, genital sores, hematuria, pelvic pain, urgency, vaginal bleeding and vaginal discharge.   Musculoskeletal: Negative for arthralgias, joint swelling and myalgias.   Skin: Negative for rash.   Neurological: Negative for dizziness, weakness, headaches and paresthesias.   Psychiatric/Behavioral: Negative for mood changes. The patient is nervous/anxious.          OBJECTIVE:   BP 92/70   Pulse 79   Temp 97.9  F (36.6  C)   Resp 14   Ht 1.695 m (5' 6.75\")   Wt 68.5 kg (151 lb)   LMP  (LMP Unknown)   SpO2 97%   BMI 23.83 kg/m   Estimated body mass index is 23.83 kg/m  as calculated from the following:    Height as of this encounter: 1.695 m (5' 6.75\").    " Weight as of this encounter: 68.5 kg (151 lb).  Physical Exam  GENERAL: healthy, alert and no distress  EYES: Eyes grossly normal to inspection, PERRL and conjunctivae and sclerae normal  HENT: ear canals and TM's normal, nose and mouth without ulcers or lesions  NECK: no adenopathy, no asymmetry, masses, or scars and thyroid normal to palpation  RESP: lungs clear to auscultation - no rales, rhonchi or wheezes  BREAST: normal without masses, tenderness or nipple discharge and no palpable axillary masses or adenopathy  CV: regular rate and rhythm, normal S1 S2, no S3 or S4, no murmur, click or rub, no peripheral edema and peripheral pulses strong  ABDOMEN: soft, nontender, no hepatosplenomegaly, no masses and bowel sounds normal   (female): deferred  MS: no gross musculoskeletal defects noted, no edema  SKIN: no suspicious lesions or rashes  NEURO: Normal strength and tone, mentation intact and speech normal  PSYCH: mentation appears normal, affect normal/bright      ASSESSMENT / PLAN:   Nandini was seen today for wellness visit.    Diagnoses and all orders for this visit:    Encounter for Medicare annual wellness exam  -     PRIMARY CARE FOLLOW-UP SCHEDULING; Future   Routine health maintenance discussion:  No smoking, limited alcohol (7 or less servings per week), 5 fruits/veg servings per day, 200 minutes of exercise per week.  Daily calcium/vitamin D guidelines, bone health, colon cancer screening beginning at age 50.  Accident avoidance, sun screen.     Insomnia, unspecified type  -     eszopiclone (LUNESTA) 1 MG tablet; Take 1 tablet (1 mg) by mouth At Bedtime   Doing well generally at this time with sparing use of her Lunesta. Will renew today and she will f/u in one year if doing well and with her psychiatrist    Generalized anxiety disorder  -     QUEtiapine (SEROQUEL XR) 50 MG TB24 24 hr tablet; Take 2 tablets (100 mg) by mouth daily for 14 days, THEN 1 tablet (50 mg) daily for 14 days. Then stop  -      traZODone (DESYREL) 50 MG tablet; Take 1 tablet (50 mg) by mouth At Bedtime for 14 days, THEN 1-2 tablets ( mg) At Bedtime for 30 days.  -     clonazePAM (KLONOPIN) 1 MG tablet; Take 1 tablet (1 mg) by mouth as needed   Patient would like to wean off of the Seroquel, will have her wean down and start trazodone per her request. She will f/u with psychiatry as scheduled as well. In the meantime, she will reach out if this is not working.     Asymptomatic menopausal state  -     DX Hip/Pelvis/Spine; Future    Lipid screening    Screening for diabetes mellitus  -     Lipid panel reflex to direct LDL Fasting; Future  -     Comprehensive metabolic panel (BMP + Alb, Alk Phos, ALT, AST, Total. Bili, TP); Future    Other orders  -     REVIEW OF HEALTH MAINTENANCE PROTOCOL ORDERS        Patient has been advised of split billing requirements and indicates understanding: Yes      COUNSELING:  Reviewed preventive health counseling, as reflected in patient instructions       Regular exercise       Healthy diet/nutrition       Dental care       Bladder control       Osteoporosis prevention/bone health       Colon cancer screening       Hepatitis C screening        She reports that she has never smoked. She has never used smokeless tobacco.      Appropriate preventive services were discussed with this patient, including applicable screening as appropriate for cardiovascular disease, diabetes, osteopenia/osteoporosis, and glaucoma.  As appropriate for age/gender, discussed screening for colorectal cancer, prostate cancer, breast cancer, and cervical cancer. Checklist reviewing preventive services available has been given to the patient.    Reviewed patients plan of care and provided an AVS. The Basic Care Plan (routine screening as documented in Health Maintenance) for Nandini meets the Care Plan requirement. This Care Plan has been established and reviewed with the Patient.          Faiza Myles MD  [facility]  [facility] [place]    Identified Health Risks:

## 2022-11-28 PROBLEM — R35.0 INCREASED URINARY FREQUENCY: Status: RESOLVED | Noted: 2017-06-30 | Resolved: 2022-11-28

## 2022-11-28 PROBLEM — N81.3 UTEROVAGINAL PROLAPSE, COMPLETE: Status: RESOLVED | Noted: 2018-12-05 | Resolved: 2022-11-28

## 2022-11-28 PROBLEM — I83.812 VARICOSE VEINS OF LEFT LOWER EXTREMITY WITH PAIN: Status: RESOLVED | Noted: 2021-09-23 | Resolved: 2022-11-28

## 2022-12-03 NOTE — TELEPHONE ENCOUNTER
Already spoke with patient. She has no further questions. Duplicate encounter.  
Who is calling:  Nandini  Reason for Call: She doesn't like the results of her blood work.  Thinks it's high.  Would like someone from Dr Myles's care team to call her  Okay to leave a detailed message: Yes      
Statement Selected

## 2023-01-03 ENCOUNTER — TELEPHONE (OUTPATIENT)
Dept: FAMILY MEDICINE | Facility: CLINIC | Age: 68
End: 2023-01-03

## 2023-01-03 NOTE — TELEPHONE ENCOUNTER
Reason for call:  Other   Patient called regarding (reason for call): appointment  Additional comments:   Patient seeking a Sooner Shingles Shot appt before Feb 1 if possible. She has to arrange transportation from her spouse.  Please reach out to patient and thank you.     Phone number to reach patient:  Cell number on file:    Telephone Information:   Mobile [phone]       Best Time:  any    Can we leave a detailed message on this number?  YES    Travel screening: Not Applicable

## 2023-01-05 ENCOUNTER — TELEPHONE (OUTPATIENT)
Dept: FAMILY MEDICINE | Facility: CLINIC | Age: 68
End: 2023-01-05

## 2023-01-05 ENCOUNTER — ALLIED HEALTH/NURSE VISIT (OUTPATIENT)
Dept: FAMILY MEDICINE | Facility: CLINIC | Age: 68
End: 2023-01-05
Payer: COMMERCIAL

## 2023-01-05 DIAGNOSIS — Z23 IMMUNIZATION DUE: Primary | ICD-10-CM

## 2023-01-05 DIAGNOSIS — Z23 NEED FOR SHINGLES VACCINE: Primary | ICD-10-CM

## 2023-01-05 PROCEDURE — 99207 PR NO CHARGE NURSE ONLY: CPT

## 2023-01-05 PROCEDURE — 90750 HZV VACC RECOMBINANT IM: CPT

## 2023-01-05 PROCEDURE — 90471 IMMUNIZATION ADMIN: CPT

## 2023-01-14 ENCOUNTER — HEALTH MAINTENANCE LETTER (OUTPATIENT)
Age: 68
End: 2023-01-14

## 2023-02-28 ASSESSMENT — ANXIETY QUESTIONNAIRES
IF YOU CHECKED OFF ANY PROBLEMS ON THIS QUESTIONNAIRE, HOW DIFFICULT HAVE THESE PROBLEMS MADE IT FOR YOU TO DO YOUR WORK, TAKE CARE OF THINGS AT HOME, OR GET ALONG WITH OTHER PEOPLE: SOMEWHAT DIFFICULT
GAD7 TOTAL SCORE: 7
3. WORRYING TOO MUCH ABOUT DIFFERENT THINGS: SEVERAL DAYS
GAD7 TOTAL SCORE: 7
4. TROUBLE RELAXING: SEVERAL DAYS
7. FEELING AFRAID AS IF SOMETHING AWFUL MIGHT HAPPEN: SEVERAL DAYS
5. BEING SO RESTLESS THAT IT IS HARD TO SIT STILL: NOT AT ALL
GAD7 TOTAL SCORE: 7
1. FEELING NERVOUS, ANXIOUS, OR ON EDGE: SEVERAL DAYS
6. BECOMING EASILY ANNOYED OR IRRITABLE: SEVERAL DAYS
8. IF YOU CHECKED OFF ANY PROBLEMS, HOW DIFFICULT HAVE THESE MADE IT FOR YOU TO DO YOUR WORK, TAKE CARE OF THINGS AT HOME, OR GET ALONG WITH OTHER PEOPLE?: SOMEWHAT DIFFICULT
7. FEELING AFRAID AS IF SOMETHING AWFUL MIGHT HAPPEN: SEVERAL DAYS
2. NOT BEING ABLE TO STOP OR CONTROL WORRYING: MORE THAN HALF THE DAYS

## 2023-02-28 ASSESSMENT — PATIENT HEALTH QUESTIONNAIRE - PHQ9
SUM OF ALL RESPONSES TO PHQ QUESTIONS 1-9: 7
10. IF YOU CHECKED OFF ANY PROBLEMS, HOW DIFFICULT HAVE THESE PROBLEMS MADE IT FOR YOU TO DO YOUR WORK, TAKE CARE OF THINGS AT HOME, OR GET ALONG WITH OTHER PEOPLE: SOMEWHAT DIFFICULT
SUM OF ALL RESPONSES TO PHQ QUESTIONS 1-9: 7

## 2023-03-01 ENCOUNTER — VIRTUAL VISIT (OUTPATIENT)
Dept: PSYCHIATRY | Facility: CLINIC | Age: 68
End: 2023-03-01
Payer: COMMERCIAL

## 2023-03-01 DIAGNOSIS — F41.1 GENERALIZED ANXIETY DISORDER: ICD-10-CM

## 2023-03-01 DIAGNOSIS — G47.00 INSOMNIA, UNSPECIFIED TYPE: ICD-10-CM

## 2023-03-01 PROCEDURE — 99205 OFFICE O/P NEW HI 60 MIN: CPT | Mod: VID | Performed by: PSYCHIATRY & NEUROLOGY

## 2023-03-01 RX ORDER — QUETIAPINE FUMARATE 50 MG/1
100 TABLET, EXTENDED RELEASE ORAL DAILY
Qty: 62 TABLET | Refills: 3 | Status: SHIPPED | OUTPATIENT
Start: 2023-03-01 | End: 2023-07-06

## 2023-03-01 RX ORDER — CLONAZEPAM 1 MG/1
1 TABLET ORAL PRN
Qty: 10 TABLET | Refills: 3 | Status: SHIPPED | OUTPATIENT
Start: 2023-03-01 | End: 2023-08-22

## 2023-03-01 RX ORDER — ESZOPICLONE 1 MG/1
1 TABLET, FILM COATED ORAL AT BEDTIME
Qty: 30 TABLET | Refills: 2 | Status: SHIPPED | OUTPATIENT
Start: 2023-03-01 | End: 2023-06-05

## 2023-03-01 NOTE — PROGRESS NOTES
Psychiatric clinic intake note:    The patient was seen for an intake to this clinic on March 1, 2023.  She carries a diagnosis, apparently major depression.  Old records are unavailable but she has been followed by Dr. Gal chowdhury for anxiety and depression but he retired last October.  The patient last saw that provider in September 2020.  She is looking to change providers and was sent to this clinic during that transition process.  The patient is currently on Seroquel 100 mg at night, Lunesta 1 mg at night and she also has occasional use of Klonopin.  The patient has 1 psychiatric hospitalization 13 years ago when she was under quite a bit of stress due to her daughter marrying somebody that she was uncomfortable with but things are better now.  The patient presents at this time requesting no medication changes but needing refills.  There was no evidence of any suicidality.  No psychosis or amari.  She was doing well from a medical standpoint and tolerating the medication.    HPI:  The patient with history as described above presented to this clinic for an intake with me for medication refill until she could transition to another psychiatrist in this clinic.  She told me she was doing relatively well and felt that 100 mg of Seroquel was helping her sleep and her anxiety.  She told me that overall she tends to get depressed when the weather is bad but now is feeling a bit better.  She went in September to see down to watch her grandchildren and that was helpful in April she will be going back to see her other grandchildren in [place] and she is looking forward to both of those.  She denies feeling hopeless or helpless or worthless.  No thoughts of hurting herself or wishing she was dead.  No hallucinations or delusions.  No amari.  She reports sleep is fairly good but her prior doctor dropped her Seroquel down to 50 mg that she was doing better when she was on 100 mg and would like to go back up to that  dosage.  She states concentration and focus and energy are variable but adequate.  She states that she is sleeping about 5 to 6 hours a night and she was recently given some Lunesta and believes that has been somewhat helpful.  She states she is doing things socially.  She reports there is been no new medical issues or concerns.  I did have an opportunity to review her chart and past medical history but I did not have access to her psychiatric history.  She however was organized and participated well and seemed reasonable in her presentation.  She states the Klonopin is been quite helpful and she uses about 10 of those a month and would like to continue that.  Otherwise she had no other questions or concerns.    Past medical history:  No known drug allergies but the patient did have GI upset when she tried trazodone recently.  The patient denies having any significant chronic or recent medical concerns or complaints.  She does struggle with dry eyes and has been seen by the [place] [facility] for this.  She is followed medically by Dr. Dejesus and very much likes that doctor.  She will have her second shingles injection next week otherwise has no ongoing significant medical concerns or complaints although she did have some elevated cholesterol for which she is being monitored.    Past psychiatric history:  Patient believes she has a diagnosis of depression and/or anxiety.  She is on medication as described above.  She was psychiatrically hospitalized once 13 years ago when she was under quite a stress because she was uncomfortable with the man that her daughter was marrying.  That relationship has gone well and they have 3 kids.  She stated that he is quite socially awkward and she worried about her future with him.  The patient states that at that time she was under quite a bit of stress and lost a lot of weight and was down to 98 pounds.    The patient has never had any suicide attempts or suicidal ideation.  No  history of any psychosis or amari.  No reported history of any eating disorder or OCD symptoms.  She has been on Seroquel as well and has rare Klonopin for a number of years and is found that to be helpful and well tolerated.  She is requesting no medication changes at this time.    Chemical dependency history:  The patient denies having any history of alcohol abuse.  No illicit drug use or prescription medication misuse.  She has never been to detox and has never had a DWI.  She does not drink alcohol.    Family history:  The patient states that her mother struggled with some probable mental health issues.  She described her mother as domineering and controlling and likely depressed.      Social history:  Please see the chart for additional details but the patient was born and raised in a small town in [place].  She has 4 sisters but 2 of them have passed away.  Her father was a professor at [employer] and they l live near there.  She also spent some of her childhood in [facility].  She stated she was fairly lonely due to social isolation but there was no abuse growing up.  Her childhood was basically happy and she had many friends.  She obtained a bachelor of science degree in elementary education in early childhood development.  No history of any learning disabilities.  She is currently  and has been  for 47 years she has 2 sons and a daughter and multiple grandchildren.  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  She loves art.    Objective:   Appearance:   Patient appeared in no significant distress. Alert. Able to maintain eye contact. No shortness of breath. No obvious pain.  Behavior:   No evidence of behavioral dyscontrol. The patient is participating and attentive with me. Not restless. No agitation.  Speech:  Patient is able to participate appropriately.  Intact sentence structure. Able to dialogue and initiate. Answers are appropriate. Consistent responses.  Not pressured or rambling.   Mood/Affect:  Mood appears stable. No depression. No obvious anxiety. Not irritable. No lability or amari is evident.  Thought Content: No evidence of any psychosis. No reports of any recent psychosis.  Suicidal or Homicidal Thoughts:  None apparent or reported. The patient denies any suicidal or homicidal ideation.   Thought Process/Formulation:   The patient is tracking well and is attentive and participating. No racing thoughts. Not loose.  Associations:  Grossly intact. Able to process information.  Fund of Knowledge:  Grossly intact. No reports of any recent changes.   Attention/Concentration:   Concentration appears grossly intact.  The patient is following conversation. Attentive and participating. No racing thoughts. Not loose or disorganized.  Insight:  Grossly intact.   Judgement:  Grossly intact.   Memory:  Grossly intact.   Motor Status:  No recent change reported. No asymmetry. No current tremor.  Orientation: Grossly oriented.    Review of systems:  No recent illnesses or health concerns.  Please see the chart for details.  No new tremors or significant side effects to the current medications.  No chest pain or shortness of breath.    Diagnoses:  Generalized anxiety disorder  Major depressive disorder, moderate, recurrent, without psychotic features    Assessment:  The patient presents at this time with diagnoses and symptoms as described above.  Her psychiatrist is retired and she is moving to this clinic.  I do not have the old records but at this point she states she is doing relatively well.  There is no evidence of any suicidality, psychosis or amari.  She is tolerating the medication but would like to increase her Seroquel back up to 100 mg a day.  She is rarely using the Klonopin and she is using the Lunesta.  She is requesting no changes at this time and reports she is been compliant with the medication.    Plan:  I am going to increase the patient's Seroquel to 100  mg at night.  At this time we will continue with her Klonopin and I will give her a 10-day supply.  She will continue with her Lunesta.  She is currently seeing a therapist and feels that relationship has been helpful.  She will continue with that.  She will return to this clinic in 3 to 4 months to see a new medication provider.  I will make no other changes at this time.    Total time for chart review, patient interview, coordination of care and documentation is 65 minutes.  The patient was interviewed via [employer] for a virtual visit.  She was in her own residence.  I was at my office.    Peña Sawyer MD

## 2023-03-08 ENCOUNTER — ALLIED HEALTH/NURSE VISIT (OUTPATIENT)
Dept: FAMILY MEDICINE | Facility: CLINIC | Age: 68
End: 2023-03-08
Payer: COMMERCIAL

## 2023-03-08 DIAGNOSIS — Z23 NEED FOR SHINGLES VACCINE: Primary | ICD-10-CM

## 2023-03-08 PROCEDURE — 99207 PR NO CHARGE NURSE ONLY: CPT | Performed by: FAMILY MEDICINE

## 2023-03-08 PROCEDURE — 90471 IMMUNIZATION ADMIN: CPT | Performed by: FAMILY MEDICINE

## 2023-03-08 PROCEDURE — 90750 HZV VACC RECOMBINANT IM: CPT | Performed by: FAMILY MEDICINE

## 2023-06-05 DIAGNOSIS — G47.00 INSOMNIA, UNSPECIFIED TYPE: ICD-10-CM

## 2023-06-05 NOTE — TELEPHONE ENCOUNTER
[facility] Call Center    Phone Message    May a detailed message be left on voicemail: yes     Reason for Call: Medication Refill Request    Has the patient contacted the pharmacy for the refill? Yes   Name of medication being requested: eszopiclone (LUNESTA) 1 MG tablet  Provider who prescribed the medication: Digna  Pharmacy: [facility] pharmacy  Date medication is needed: ASAP    Please contact patient back at [phone] when refilled.      Action Taken: Message routed to:  Other: [facility]    Travel Screening: Not Applicable

## 2023-06-05 NOTE — TELEPHONE ENCOUNTER
Medication refill request for     eszopiclone (LUNESTA) 1 MG tablet, Last date refilled:  3/1/2023, Last Fill Quantity: 30,  # refills: 2    Last office visit provider:  3/1/2023  Next appointment scheduled: 6/15/2023    Medication T'd for review and signature    FAISAL Crawford on 6/5/2023 at 8:13 AM

## 2023-06-06 RX ORDER — ESZOPICLONE 1 MG/1
1 TABLET, FILM COATED ORAL AT BEDTIME
Qty: 30 TABLET | Refills: 3 | Status: SHIPPED | OUTPATIENT
Start: 2023-06-06 | End: 2023-10-05

## 2023-07-05 DIAGNOSIS — F41.1 GENERALIZED ANXIETY DISORDER: ICD-10-CM

## 2023-07-05 NOTE — TELEPHONE ENCOUNTER
RX refill request for QUEtiapine (SEROQUEL XR) 50 MG TB24 24 hr tablet       Last follow-up; 03/01/23 Next follow-up; none    Medication T'd for review and signature    Kelsey Morris MA on 7/5/2023 at 2:36 PM         QUEtiapine (SEROQUEL XR) 50 MG TB24 24 hr tablet 62 tablet 3 3/1/2023  No   Sig - Route: Take 2 tablets (100 mg) by mouth daily - Oral

## 2023-07-06 ENCOUNTER — TELEPHONE (OUTPATIENT)
Dept: NEUROLOGY | Facility: CLINIC | Age: 68
End: 2023-07-06
Payer: COMMERCIAL

## 2023-07-06 RX ORDER — QUETIAPINE FUMARATE 50 MG/1
TABLET, EXTENDED RELEASE ORAL
Qty: 62 TABLET | Refills: 3 | Status: SHIPPED | OUTPATIENT
Start: 2023-07-06 | End: 2023-10-10

## 2023-07-06 NOTE — TELEPHONE ENCOUNTER
Medication refilled by Dr. Sawyer with 3 refills. No action needed.    Myron Bettencourt, VILMA ATC on 7/6/2023 at 9:16 AM

## 2023-07-06 NOTE — TELEPHONE ENCOUNTER
[facility] Call Center    Phone Message    May a detailed message be left on voicemail: yes     Reason for Call: Medication Question or concern regarding medication   Prescription Clarification  Name of Medication:QUEtiapine (SEROQUEL XR) 50 MG TB24 24 hr tablet  Prescribing Provider:    Pharmacy: [facility]/PHARMACY #[id number] [place], [place] - [address] AT [facility] & [place]   What on the order needs clarification? Patient called states that she is out of net work with , patient is requesting if  will give her some refills until she can see her PMD again. Patient confirms that she is out of medications as of now.           Action Taken: Other: [facility]    Travel Screening: Not Applicable

## 2023-08-11 ENCOUNTER — ANCILLARY PROCEDURE (OUTPATIENT)
Dept: BONE DENSITY | Facility: CLINIC | Age: 68
End: 2023-08-11
Attending: FAMILY MEDICINE
Payer: COMMERCIAL

## 2023-08-11 DIAGNOSIS — Z78.0 ASYMPTOMATIC MENOPAUSAL STATE: ICD-10-CM

## 2023-08-11 PROCEDURE — 77080 DXA BONE DENSITY AXIAL: CPT | Mod: TC | Performed by: PHYSICIAN ASSISTANT

## 2023-08-22 DIAGNOSIS — F41.1 GENERALIZED ANXIETY DISORDER: ICD-10-CM

## 2023-08-22 RX ORDER — CLONAZEPAM 1 MG/1
1 TABLET ORAL PRN
Qty: 10 TABLET | Refills: 3 | Status: SHIPPED | OUTPATIENT
Start: 2023-08-22 | End: 2023-10-10

## 2023-08-22 NOTE — TELEPHONE ENCOUNTER
Pt saw provider at [facility] on 3/1. She is having insurance issues with coverage for appt.     Is scheduled to see PCP in Mid Oct, needing refill for clonazepam until this appt. Is also looking for new psychiatrist that is in network.     RN pended refill below for clonazepam 1mg, MD please sign.     Emil Cristobal RN, BSN  [facility]

## 2023-08-22 NOTE — TELEPHONE ENCOUNTER
[facility] Call [facility]    Phone Message    May a detailed message be left on voicemail: yes     Reason for Call: Other: Pt called back as she was returning Emil's call. Please call Pt and advise at [phone]. Pt stated she should be available the rest of the day.    Action Taken: Message routed to:  Other: [facility]     Travel Screening: Not Applicable

## 2023-08-22 NOTE — TELEPHONE ENCOUNTER
[facility] Call [facility]    Phone Message    May a detailed message be left on voicemail: yes     Reason for Call: Medication Question or concern regarding medication   Prescription Clarification  Name of Medication: clonazePAM (KLONOPIN) 1 MG tablet  Prescribing Provider:    Pharmacy: [facility]/PHARMACY #[id number] - [place], [place] - [address] AT [facility] & [place]    What on the order needs clarification?     Patient called states that she is out of medicatin, pt is requesting a refill please      Action Taken: Other: [facility]    Travel Screening: Not Applicable

## 2023-08-22 NOTE — TELEPHONE ENCOUNTER
LMTRC.     Per RN chart review pt has never been seen at [facility], may be a private practice pt.     Asked pt to return call to discuss further, would like to know when and where she last saw Dr. Sawyer.     Refills through [facility] are not appropriate unless she has been seen here.     Emil Cristobal, RN, BSN  [facility]

## 2023-09-27 ENCOUNTER — TELEPHONE (OUTPATIENT)
Dept: FAMILY MEDICINE | Facility: CLINIC | Age: 68
End: 2023-09-27
Payer: COMMERCIAL

## 2023-09-27 NOTE — TELEPHONE ENCOUNTER
Patient is coming in for an appointment on 10/10 to see Dr. Myles, she is wondering if the  Jh will be in that day to draw her blood? Please call her aback at [phone].  Nica Haro   [facility]  Central Scheduler

## 2023-10-02 DIAGNOSIS — G47.00 INSOMNIA, UNSPECIFIED TYPE: ICD-10-CM

## 2023-10-03 ASSESSMENT — ENCOUNTER SYMPTOMS
SORE THROAT: 0
FREQUENCY: 0
FEVER: 0
COUGH: 0
HEMATURIA: 0
SHORTNESS OF BREATH: 0
NERVOUS/ANXIOUS: 1
HEADACHES: 0
ABDOMINAL PAIN: 0
HEMATOCHEZIA: 0
WEAKNESS: 0
PALPITATIONS: 0
DIZZINESS: 0
ARTHRALGIAS: 0
CONSTIPATION: 1
HEARTBURN: 0
DYSURIA: 0
CHILLS: 0
PARESTHESIAS: 0
BREAST MASS: 0
EYE PAIN: 0
MYALGIAS: 0
JOINT SWELLING: 0
NAUSEA: 0
DIARRHEA: 0

## 2023-10-03 ASSESSMENT — ACTIVITIES OF DAILY LIVING (ADL): CURRENT_FUNCTION: NO ASSISTANCE NEEDED

## 2023-10-03 NOTE — TELEPHONE ENCOUNTER
Refill request for eszopiclone (LUNESTA) 1 MG tablet   Last follow-up 3/1/23; next follow-up unscheduled Pt cancelled last follow-up   Medication T'd for review and signature  Myron Bettencourt, VILMA ATC on 10/3/2023 at 1:54 PM    eszopiclone (LUNESTA) 1 MG tablet 30 tablet 3 6/6/2023  No   Sig - Route: Take 1 tablet (1 mg) by mouth At Bedtime - Oral

## 2023-10-04 NOTE — TELEPHONE ENCOUNTER
[facility] Call Center    Phone Message    May a detailed message be left on voicemail: yes     Reason for Call: Medication Refill Request    Has the patient contacted the pharmacy for the refill? Yes   Name of medication being requested: eszopiclone (LUNESTA) 1 MG tablet   Provider who prescribed the medication:   Pharmacy:   [facility]/PHARMACY #[id number] - [place], [place] - [address] AT [facility] & [place]     Date medication is needed: ASAP       Action Taken: Message routed to:  Other: [facility]    Travel Screening: Not Applicable

## 2023-10-05 RX ORDER — ESZOPICLONE 1 MG/1
1 TABLET, FILM COATED ORAL AT BEDTIME
Qty: 30 TABLET | Refills: 3 | Status: SHIPPED | OUTPATIENT
Start: 2023-10-05 | End: 2024-02-08

## 2023-10-10 ENCOUNTER — LAB (OUTPATIENT)
Dept: LAB | Facility: CLINIC | Age: 68
End: 2023-10-10
Payer: COMMERCIAL

## 2023-10-10 ENCOUNTER — OFFICE VISIT (OUTPATIENT)
Dept: FAMILY MEDICINE | Facility: CLINIC | Age: 68
End: 2023-10-10
Payer: COMMERCIAL

## 2023-10-10 VITALS
SYSTOLIC BLOOD PRESSURE: 98 MMHG | DIASTOLIC BLOOD PRESSURE: 60 MMHG | TEMPERATURE: 97.8 F | OXYGEN SATURATION: 97 % | HEART RATE: 72 BPM | WEIGHT: 152 LBS | BODY MASS INDEX: 23.86 KG/M2 | RESPIRATION RATE: 15 BRPM | HEIGHT: 67 IN

## 2023-10-10 DIAGNOSIS — Z13.220 LIPID SCREENING: ICD-10-CM

## 2023-10-10 DIAGNOSIS — Z51.81 THERAPEUTIC DRUG MONITORING: ICD-10-CM

## 2023-10-10 DIAGNOSIS — L82.1 SEBORRHEIC KERATOSIS: ICD-10-CM

## 2023-10-10 DIAGNOSIS — Z00.00 ENCOUNTER FOR MEDICARE ANNUAL WELLNESS EXAM: Primary | ICD-10-CM

## 2023-10-10 DIAGNOSIS — N95.2 ATROPHIC VAGINITIS: ICD-10-CM

## 2023-10-10 DIAGNOSIS — Z13.1 SCREENING FOR DIABETES MELLITUS: ICD-10-CM

## 2023-10-10 DIAGNOSIS — F41.1 GENERALIZED ANXIETY DISORDER: ICD-10-CM

## 2023-10-10 PROBLEM — L57.0 ACTINIC KERATOSIS: Status: ACTIVE | Noted: 2023-10-10

## 2023-10-10 LAB
ALBUMIN SERPL BCG-MCNC: 4.5 G/DL (ref 3.5–5.2)
ALP SERPL-CCNC: 75 U/L (ref 35–104)
ALT SERPL W P-5'-P-CCNC: 13 U/L (ref 0–50)
ANION GAP SERPL CALCULATED.3IONS-SCNC: 13 MMOL/L (ref 7–15)
AST SERPL W P-5'-P-CCNC: 26 U/L (ref 0–45)
BILIRUB SERPL-MCNC: 1.1 MG/DL
BUN SERPL-MCNC: 13.8 MG/DL (ref 8–23)
CALCIUM SERPL-MCNC: 9.4 MG/DL (ref 8.8–10.2)
CHLORIDE SERPL-SCNC: 103 MMOL/L (ref 98–107)
CHOLEST SERPL-MCNC: 252 MG/DL
CREAT SERPL-MCNC: 0.74 MG/DL (ref 0.51–0.95)
DEPRECATED HCO3 PLAS-SCNC: 24 MMOL/L (ref 22–29)
EGFRCR SERPLBLD CKD-EPI 2021: 87 ML/MIN/1.73M2
GLUCOSE SERPL-MCNC: 102 MG/DL (ref 70–99)
HBA1C MFR BLD: 5.2 % (ref 0–5.6)
HDLC SERPL-MCNC: 95 MG/DL
LDLC SERPL CALC-MCNC: 143 MG/DL
NONHDLC SERPL-MCNC: 157 MG/DL
POTASSIUM SERPL-SCNC: 4.2 MMOL/L (ref 3.4–5.3)
PROT SERPL-MCNC: 7.1 G/DL (ref 6.4–8.3)
SODIUM SERPL-SCNC: 140 MMOL/L (ref 135–145)
TRIGL SERPL-MCNC: 72 MG/DL
TSH SERPL DL<=0.005 MIU/L-ACNC: 3.54 UIU/ML (ref 0.3–4.2)

## 2023-10-10 PROCEDURE — 84443 ASSAY THYROID STIM HORMONE: CPT

## 2023-10-10 PROCEDURE — 17110 DESTRUCTION B9 LES UP TO 14: CPT | Performed by: FAMILY MEDICINE

## 2023-10-10 PROCEDURE — 99213 OFFICE O/P EST LOW 20 MIN: CPT | Mod: 25 | Performed by: FAMILY MEDICINE

## 2023-10-10 PROCEDURE — 99397 PER PM REEVAL EST PAT 65+ YR: CPT | Mod: 25 | Performed by: FAMILY MEDICINE

## 2023-10-10 PROCEDURE — 80053 COMPREHEN METABOLIC PANEL: CPT

## 2023-10-10 PROCEDURE — 80061 LIPID PANEL: CPT

## 2023-10-10 PROCEDURE — 36415 COLL VENOUS BLD VENIPUNCTURE: CPT

## 2023-10-10 PROCEDURE — 83036 HEMOGLOBIN GLYCOSYLATED A1C: CPT

## 2023-10-10 RX ORDER — CLONAZEPAM 1 MG/1
1 TABLET ORAL PRN
Qty: 10 TABLET | Refills: 3 | Status: SHIPPED | OUTPATIENT
Start: 2023-10-10 | End: 2024-04-26

## 2023-10-10 RX ORDER — OMEGA-3 FATTY ACIDS/FISH OIL 300-1000MG
CAPSULE ORAL
COMMUNITY

## 2023-10-10 RX ORDER — QUETIAPINE FUMARATE 50 MG/1
100 TABLET, EXTENDED RELEASE ORAL DAILY
Qty: 180 TABLET | Refills: 3 | Status: SHIPPED | OUTPATIENT
Start: 2023-10-10 | End: 2024-06-05

## 2023-10-10 RX ORDER — ESTRADIOL 0.1 MG/G
2 CREAM VAGINAL
Qty: 42.5 G | Refills: 3 | Status: SHIPPED | OUTPATIENT
Start: 2023-10-12

## 2023-10-10 RX ORDER — FAMOTIDINE 20 MG
TABLET ORAL
COMMUNITY

## 2023-10-10 RX ORDER — CYCLOSPORINE 0.5 MG/ML
1 EMULSION OPHTHALMIC 2 TIMES DAILY
COMMUNITY

## 2023-10-10 ASSESSMENT — ENCOUNTER SYMPTOMS
HEADACHES: 0
HEMATURIA: 0
DYSURIA: 0
NERVOUS/ANXIOUS: 1
SORE THROAT: 0
SHORTNESS OF BREATH: 0
DIARRHEA: 0
BREAST MASS: 0
EYE PAIN: 0
PALPITATIONS: 0
MYALGIAS: 0
DIZZINESS: 0
WEAKNESS: 0
PARESTHESIAS: 0
ABDOMINAL PAIN: 0
CHILLS: 0
CONSTIPATION: 1
ARTHRALGIAS: 0
FREQUENCY: 0
NAUSEA: 0
FEVER: 0
JOINT SWELLING: 0
HEARTBURN: 0
HEMATOCHEZIA: 0
COUGH: 0

## 2023-10-10 ASSESSMENT — ACTIVITIES OF DAILY LIVING (ADL): CURRENT_FUNCTION: NO ASSISTANCE NEEDED

## 2023-10-10 NOTE — PATIENT INSTRUCTIONS
I'd have you look into [facility] as an option    Additionally: [facility] [phone]  Kyree Herrera, Lizette Staples, Stu Ruiz    For your bladder I'd like to have you try the topical estrogen again. If this is not helping we may want to have you see Dr. Jones again to talk about a Pessary.       Patient Education   Personalized Prevention Plan  You are due for the preventive services outlined below.  Your care team is available to assist you in scheduling these services.  If you have already completed any of these items, please share that information with your care team to update in your medical record.  Health Maintenance Due   Topic Date Due    Flu Vaccine (1) Never done    COVID-19 Vaccine (3 - 2023-24 season) 09/01/2023

## 2023-10-10 NOTE — PROGRESS NOTES
"SUBJECTIVE:   Nandini is a 68 year old who presents for Preventive Visit.      10/10/2023     7:15 AM   Additional Questions   Roomed by More     Are you in the first 12 months of your Medicare coverage?  No    Healthy Habits:     In general, how would you rate your overall health?  Good    Frequency of exercise:  4-5 days/week    Duration of exercise:  45-60 minutes    Do you usually eat at least 4 servings of fruit and vegetables a day, include whole grains    & fiber and avoid regularly eating high fat or \"junk\" foods?  Yes    Taking medications regularly:  Yes    Medication side effects:  None    Ability to successfully perform activities of daily living:  No assistance needed    Home Safety:  No safety concerns identified    Hearing Impairment:  No hearing concerns    In the past 6 months, have you been bothered by leaking of urine?  No    In general, how would you rate your overall mental or emotional health?  Good    Additional concerns today:  Yes      She did find a new psychiatrist through [facility]. She did have a 20 minute video chat with this provider. She did end up getting a bill for $750. She does hope to have refills of her medications while she looks for a new psychiatrist.     She does have dry eye as well and has been seeking care for this and this has been challenging and expensive.     She is cutting down on her soda, has no started weight training. Is walking or doing bike/elliptical. She does have an upcoming diagnostic mammogram as well coming up.     She does have an urge to urinate all the time, not just when she is anxious. She never feels like she is completely voiding, is wondering if her bladder is falling. She does not have pain with urination. She does have pain after intercourse. She does use a lot of lubrication with sex, but still has pain.  She did try some topical estrogen and that did not help.     She does have a few keratoses to check on as well.     Today's PHQ-2 " Score:       10/9/2023    11:29 AM   PHQ-2 ( 1999 Pfizer)   Q1: Little interest or pleasure in doing things 1   Q2: Feeling down, depressed or hopeless 1   PHQ-2 Score 2   Q1: Little interest or pleasure in doing things Several days   Q2: Feeling down, depressed or hopeless Several days   PHQ-2 Score 2        Have you ever done Advance Care Planning? (For example, a Health Directive, POLST, or a discussion with a medical provider or your loved ones about your wishes): No, advance care planning information given to patient to review.  Patient declined advance care planning discussion at this time.     Fall risk  Fallen 2 or more times in the past year?: No  Any fall with injury in the past year?: No  click delete button to remove this line now  Cognitive Screening   1) Repeat 3 items (Leader, Season, Table)    2) Clock draw: NORMAL  3) 3 item recall: Recalls 3 objects  Results: 3 items recalled: COGNITIVE IMPAIRMENT LESS LIKELY    Mini-CogTM Copyright S Laureen. Licensed by the author for use in [facility]; reprinted with permission (linda@[facility]). All rights reserved.      Reviewed and updated as needed this visit by clinical staff   Tobacco  Allergies  Meds  Problems  Med Hx  Surg Hx  Fam Hx          Reviewed and updated as needed this visit by Provider   Tobacco  Allergies  Meds  Problems  Med Hx  Surg Hx  Fam Hx         Social History     Tobacco Use     Smoking status: Never     Smokeless tobacco: Never   Substance Use Topics     Alcohol use: Not on file           10/3/2023     5:38 PM   Alcohol Use   Prescreen: >3 drinks/day or >7 drinks/week? Not Applicable     Do you have a current opioid prescription? No  Do you use any other controlled substances or medications that are not prescribed by a provider? None        Current providers sharing in care for this patient include:   Patient Care Team:  Faiza Myles MD as PCP - General (Family Medicine)  Faiza Myles MD as  Assigned PCP  Peña Sawyer MD as Assigned Behavioral Health Provider    The following health maintenance items are reviewed in Epic and correct as of today:  Health Maintenance   Topic Date Due     INFLUENZA VACCINE (1) Never done     COVID-19 Vaccine (3 - 2023-24 season) 09/01/2023     MEDICARE ANNUAL WELLNESS VISIT  11/23/2023     ANNUAL REVIEW OF HM ORDERS  11/23/2023     MAMMO SCREENING  10/05/2024     FALL RISK ASSESSMENT  10/10/2024     COLORECTAL CANCER SCREENING  12/29/2024     DTAP/TDAP/TD IMMUNIZATION (2 - Td or Tdap) 11/11/2025     LIPID  09/28/2027     ADVANCE CARE PLANNING  10/10/2028     DEXA  08/11/2038     PHQ-2 (once per calendar year)  Completed     Pneumococcal Vaccine: 65+ Years  Completed     ZOSTER IMMUNIZATION  Completed     IPV IMMUNIZATION  Aged Out     HPV IMMUNIZATION  Aged Out     MENINGITIS IMMUNIZATION  Aged Out     HEPATITIS C SCREENING  Discontinued             11/18/2022     9:24 PM   Breast CA Risk Assessment (FHS-7)   Do you have a family history of breast, colon, or ovarian cancer? No / Unknown         Mammogram Screening: Recommended mammography every 1-2 years with patient discussion and risk factor consideration  Pertinent mammograms are reviewed under the imaging tab.    Review of Systems   Constitutional:  Negative for chills and fever.   HENT:  Negative for congestion, ear pain, hearing loss and sore throat.    Eyes:  Negative for pain and visual disturbance.   Respiratory:  Negative for cough and shortness of breath.    Cardiovascular:  Negative for chest pain, palpitations and peripheral edema.   Gastrointestinal:  Positive for constipation. Negative for abdominal pain, diarrhea, heartburn, hematochezia and nausea.   Breasts:  Negative for tenderness, breast mass and discharge.   Genitourinary:  Negative for dysuria, frequency, genital sores, hematuria, pelvic pain, urgency, vaginal bleeding and vaginal discharge.   Musculoskeletal:  Negative for arthralgias, joint  "swelling and myalgias.   Skin:  Negative for rash.   Neurological:  Negative for dizziness, weakness, headaches and paresthesias.   Psychiatric/Behavioral:  Negative for mood changes. The patient is nervous/anxious.        OBJECTIVE:   BP 98/60   Pulse 72   Temp 97.8  F (36.6  C)   Resp 15   Ht 1.689 m (5' 6.5\")   Wt 68.9 kg (152 lb)   LMP  (LMP Unknown)   SpO2 97%   BMI 24.17 kg/m   Estimated body mass index is 24.17 kg/m  as calculated from the following:    Height as of this encounter: 1.689 m (5' 6.5\").    Weight as of this encounter: 68.9 kg (152 lb).  Physical Exam  GENERAL: healthy, alert and no distress  EYES: Eyes grossly normal to inspection, PERRL and conjunctivae and sclerae normal  HENT: ear canals and TM's normal, nose and mouth without ulcers or lesions  NECK: no adenopathy, no asymmetry, masses, or scars and thyroid normal to palpation  RESP: lungs clear to auscultation - no rales, rhonchi or wheezes  BREAST: normal without masses, tenderness or nipple discharge and no palpable axillary masses or adenopathy  CV: regular rate and rhythm, normal S1 S2, no S3 or S4, no murmur, click or rub, no peripheral edema and peripheral pulses strong  ABDOMEN: soft, nontender, no hepatosplenomegaly, no masses and bowel sounds normal  MS: no gross musculoskeletal defects noted, no edema  SKIN: no suspicious lesions or rashes  NEURO: Normal strength and tone, mentation intact and speech normal  PSYCH: mentation appears normal, affect normal/bright      Procedure note:    Consent: Risks and benefits of therapy discussed with patient who voices understanding and agrees with planned care. No barriers to communication or understanding identified.  After obtaining informed consent, the patient's identity, procedure, and site were verified during a pause prior to proceeding with the minor surgical procedure as per universal protocol recommendations.  8 Seborrheic keratoses were treated with cryocautery with freeze " thaw freeze technique with 2-3 mm surround freeze. All were less than 3 mm in diameter, one on the left neck, right wrist, lower abdomen and five on the back. Local care discussed. Side effects of treatment and precautions discussed. All questions answered. To follow up if worse or any new problems        ASSESSMENT / PLAN:   Nandini was seen today for wellness visit.    Diagnoses and all orders for this visit:    Encounter for Medicare annual wellness exam   Routine health maintenance discussion:  No smoking, limited alcohol (7 or less servings per week), 5 fruits/veg servings per day, 200 minutes of exercise per week.  Daily calcium/vitamin D guidelines, bone health, colon cancer screening beginning at age 50.  Accident avoidance, sun screen.    Generalized anxiety disorder  -     QUEtiapine (SEROQUEL XR) 50 MG TB24 24 hr tablet; Take 2 tablets (100 mg) by mouth daily  -     clonazePAM (KLONOPIN) 1 MG tablet; Take 1 tablet (1 mg) by mouth as needed  - Doing well at this time. Looking for a new mental health clinic. She will follow up with me as scheduled for now. She will let me know if refills needed or referral needed.     Atrophic vaginitis  -     estradiol (ESTRACE) 0.1 MG/GM vaginal cream; Place 2 g vaginally twice a week  - Will trial estrogen cream as listed, she may follow up with OB as well if wanting to talk about a pessary    Seborrheic keratosis   Frozen today without difficulty. Will call if other issues.     Lipid screening  -     Lipid panel reflex to direct LDL Fasting; Future    Screening for diabetes mellitus  -     Comprehensive metabolic panel; Future    Therapeutic drug monitoring  -     Hemoglobin A1c; Future  -     TSH with free T4 reflex; Future    Other orders  -     PRIMARY CARE FOLLOW-UP SCHEDULING; Future        Patient has been advised of split billing requirements and indicates understanding: Yes      COUNSELING:  Reviewed preventive health counseling, as reflected in patient  instructions       Regular exercise       Healthy diet/nutrition       Bladder control       Osteoporosis prevention/bone health        She reports that she has never smoked. She has never used smokeless tobacco.      Appropriate preventive services were discussed with this patient, including applicable screening as appropriate for fall prevention, nutrition, physical activity, Tobacco-use cessation, weight loss and cognition.  Checklist reviewing preventive services available has been given to the patient.    Reviewed patients plan of care and provided an AVS. The Basic Care Plan (routine screening as documented in Health Maintenance) for Nandini meets the Care Plan requirement. This Care Plan has been established and reviewed with the Patient.          Faiza Myles MD  [facility]    Identified Health Risks:  I have reviewed Opioid Use Disorder and Substance Use Disorder risk factors and made any needed referrals.

## 2023-10-11 ENCOUNTER — TELEPHONE (OUTPATIENT)
Dept: FAMILY MEDICINE | Facility: CLINIC | Age: 68
End: 2023-10-11
Payer: COMMERCIAL

## 2023-10-13 ENCOUNTER — TELEPHONE (OUTPATIENT)
Dept: FAMILY MEDICINE | Facility: CLINIC | Age: 68
End: 2023-10-13
Payer: COMMERCIAL

## 2023-10-13 NOTE — TELEPHONE ENCOUNTER
Patient Returning Call    Reason for call:  Patient called back to let staff know to leave her health screening forms at the  for her to  on Monday 10/16    Information relayed to patient:  Yes    Patient has additional questions:  No      [facility] or would you prefer to receive a phone call?:   No preference

## 2023-10-13 NOTE — TELEPHONE ENCOUNTER
Called patient and left her a message informing her that her paperwork is complete and I will fax it right now. Informed her that a copy of it will be scanned into her medical chart but if she wants a copy for her own personal records she can get in touch with us and we can place one up front for her to  (copy is in grey hanging folder)    More Larsen, CMA

## 2023-10-17 ENCOUNTER — HOSPITAL ENCOUNTER (OUTPATIENT)
Dept: MAMMOGRAPHY | Facility: CLINIC | Age: 68
Discharge: HOME | End: 2023-10-17
Attending: OBSTETRICS & GYNECOLOGY | Admitting: OBSTETRICS & GYNECOLOGY
Payer: COMMERCIAL

## 2023-10-17 DIAGNOSIS — R92.8 ABNORMAL MAMMOGRAM: ICD-10-CM

## 2023-10-17 PROCEDURE — 77061 BREAST TOMOSYNTHESIS UNI: CPT | Mod: LT

## 2024-02-07 ENCOUNTER — TELEPHONE (OUTPATIENT)
Dept: NEUROLOGY | Facility: CLINIC | Age: 69
End: 2024-02-07

## 2024-02-07 DIAGNOSIS — G47.00 INSOMNIA, UNSPECIFIED TYPE: ICD-10-CM

## 2024-02-07 NOTE — TELEPHONE ENCOUNTER
Dr. Sawyer,    Pt was last seen for a virtual visit on 3/1/2023 at the [facility].    Pt is due for a follow-up appt. Will send a TE message to the  to call pt to schedule an appt.     Medication T'd up for 1 month supply.    Please review and approve/deny as appropriate.      Thank you.      FAISAL Crawford on 2/7/2024 at 9:16 AM

## 2024-02-07 NOTE — TELEPHONE ENCOUNTER
Patient is due to come in for a follow up. Please call and help make appointment for patient to come in to be seen with Dr. Sawyer.    Thank you.      FAISAL Crawford on 2/7/2024 at 9:17 AM

## 2024-02-08 RX ORDER — ESZOPICLONE 1 MG/1
1 TABLET, FILM COATED ORAL AT BEDTIME
Qty: 30 TABLET | Refills: 0 | Status: SHIPPED | OUTPATIENT
Start: 2024-02-08 | End: 2024-06-05

## 2024-04-26 DIAGNOSIS — F41.1 GENERALIZED ANXIETY DISORDER: ICD-10-CM

## 2024-04-26 RX ORDER — CLONAZEPAM 1 MG/1
1 TABLET ORAL PRN
Qty: 10 TABLET | Refills: 3 | Status: SHIPPED | OUTPATIENT
Start: 2024-04-26 | End: 2024-06-05

## 2024-04-26 NOTE — TELEPHONE ENCOUNTER
Refill Request  Medication name: Pending Prescriptions:                       Disp   Refills    clonazePAM (KLONOPIN) 1 MG tablet         10 tab*3            Sig: Take 1 tablet (1 mg) by mouth as needed    Requested Pharmacy:  [id number] - [place], [place] - [address] AT [facility] [address]

## 2024-06-02 SDOH — HEALTH STABILITY: PHYSICAL HEALTH: ON AVERAGE, HOW MANY DAYS PER WEEK DO YOU ENGAGE IN MODERATE TO STRENUOUS EXERCISE (LIKE A BRISK WALK)?: 5 DAYS

## 2024-06-02 SDOH — HEALTH STABILITY: PHYSICAL HEALTH: ON AVERAGE, HOW MANY MINUTES DO YOU ENGAGE IN EXERCISE AT THIS LEVEL?: 60 MIN

## 2024-06-02 ASSESSMENT — SOCIAL DETERMINANTS OF HEALTH (SDOH): HOW OFTEN DO YOU GET TOGETHER WITH FRIENDS OR RELATIVES?: PATIENT DECLINED

## 2024-06-05 ENCOUNTER — OFFICE VISIT (OUTPATIENT)
Dept: FAMILY MEDICINE | Facility: CLINIC | Age: 69
End: 2024-06-05
Payer: COMMERCIAL

## 2024-06-05 VITALS
RESPIRATION RATE: 17 BRPM | HEIGHT: 67 IN | OXYGEN SATURATION: 96 % | WEIGHT: 158 LBS | SYSTOLIC BLOOD PRESSURE: 100 MMHG | HEART RATE: 70 BPM | DIASTOLIC BLOOD PRESSURE: 60 MMHG | BODY MASS INDEX: 24.8 KG/M2 | TEMPERATURE: 97.8 F

## 2024-06-05 DIAGNOSIS — Z13.1 SCREENING FOR DIABETES MELLITUS: ICD-10-CM

## 2024-06-05 DIAGNOSIS — F41.1 GENERALIZED ANXIETY DISORDER: ICD-10-CM

## 2024-06-05 DIAGNOSIS — Z00.00 ENCOUNTER FOR MEDICARE ANNUAL WELLNESS EXAM: Primary | ICD-10-CM

## 2024-06-05 DIAGNOSIS — L82.1 SEBORRHEIC KERATOSIS: ICD-10-CM

## 2024-06-05 DIAGNOSIS — Z13.220 LIPID SCREENING: ICD-10-CM

## 2024-06-05 DIAGNOSIS — G47.00 INSOMNIA, UNSPECIFIED TYPE: ICD-10-CM

## 2024-06-05 LAB — HBA1C MFR BLD: 5.4 % (ref 0–5.6)

## 2024-06-05 PROCEDURE — 17110 DESTRUCTION B9 LES UP TO 14: CPT | Performed by: FAMILY MEDICINE

## 2024-06-05 PROCEDURE — 99214 OFFICE O/P EST MOD 30 MIN: CPT | Mod: 25 | Performed by: FAMILY MEDICINE

## 2024-06-05 PROCEDURE — 99397 PER PM REEVAL EST PAT 65+ YR: CPT | Mod: 25 | Performed by: FAMILY MEDICINE

## 2024-06-05 PROCEDURE — 80061 LIPID PANEL: CPT | Performed by: FAMILY MEDICINE

## 2024-06-05 PROCEDURE — 80053 COMPREHEN METABOLIC PANEL: CPT | Performed by: FAMILY MEDICINE

## 2024-06-05 PROCEDURE — 83036 HEMOGLOBIN GLYCOSYLATED A1C: CPT | Performed by: FAMILY MEDICINE

## 2024-06-05 PROCEDURE — 36415 COLL VENOUS BLD VENIPUNCTURE: CPT | Performed by: FAMILY MEDICINE

## 2024-06-05 RX ORDER — DOCUSATE SODIUM 100 MG/1
100 CAPSULE, LIQUID FILLED ORAL 2 TIMES DAILY
COMMUNITY

## 2024-06-05 RX ORDER — CLONAZEPAM 1 MG/1
1 TABLET ORAL PRN
Qty: 10 TABLET | Refills: 3 | Status: SHIPPED | OUTPATIENT
Start: 2024-06-05

## 2024-06-05 RX ORDER — ESZOPICLONE 1 MG/1
1 TABLET, FILM COATED ORAL AT BEDTIME
Qty: 30 TABLET | Refills: 5 | Status: SHIPPED | OUTPATIENT
Start: 2024-06-05

## 2024-06-05 RX ORDER — QUETIAPINE FUMARATE 50 MG/1
100 TABLET, EXTENDED RELEASE ORAL DAILY
Qty: 180 TABLET | Refills: 3 | Status: SHIPPED | OUTPATIENT
Start: 2024-06-05

## 2024-06-05 NOTE — PROGRESS NOTES
"Preventive Care Visit  [facility]  Faiza Myles MD, Family Medicine  Jun 5, 2024      Assessment & Plan     Encounter for Medicare annual wellness exam  -Routine health maintenance discussion:  No smoking, limited alcohol (7 or less servings per week), 5 fruits/veg servings per day, 200 minutes of exercise per week.  Daily calcium/vitamin D guidelines, bone health, colon cancer screening beginning at age 50.  Accident avoidance, sun screen.     Generalized anxiety disorder  -Stable on her current medication.  Plans on establishing with mental health provider when she moves.  Okay for refills for now, if unable to find a new provider ideally would follow-up here in 6 months.  - QUEtiapine (SEROQUEL XR) 50 MG TB24 24 hr tablet  Dispense: 180 tablet; Refill: 3  - clonazePAM (KLONOPIN) 1 MG tablet  Dispense: 10 tablet; Refill: 3    Insomnia, unspecified type  -Doing well, follow-up as listed above  - eszopiclone (LUNESTA) 1 MG tablet  Dispense: 30 tablet; Refill: 5    Seborrheic keratosis  -10 irritated seborrheic keratoses frozen on the patient's torso, 1 on the back and the remainder on her anterior torso and areas of friction which cause irritation.    Screening for diabetes mellitus  - Hemoglobin A1c  - Comprehensive metabolic panel (BMP + Alb, Alk Phos, ALT, AST, Total. Bili, TP)  - Hemoglobin A1c  - Comprehensive metabolic panel (BMP + Alb, Alk Phos, ALT, AST, Total. Bili, TP)    Lipid screening  - Lipid panel reflex to direct LDL Fasting  - Lipid panel reflex to direct LDL Fasting      Patient has been advised of split billing requirements and indicates understanding: Yes        BMI  Estimated body mass index is 25.12 kg/m  as calculated from the following:    Height as of this encounter: 1.689 m (5' 6.5\").    Weight as of this encounter: 71.7 kg (158 lb).   Weight management plan: Discussed healthy diet and exercise guidelines    Counseling  Appropriate preventive services were " discussed with this patient, including applicable screening as appropriate for fall prevention, nutrition, physical activity, Tobacco-use cessation, weight loss and cognition.  Checklist reviewing preventive services available has been given to the patient.  Reviewed patient's diet, addressing concerns and/or questions.           Tarun Delatorre is a 69 year old, presenting for the following:  Physical (Keratosis she would like looked at )        6/5/2024     9:17 AM   Additional Questions   Roomed by More        Health Care Directive  Patient does not have a Health Care Directive or Living Will: not discussed today    HPI    She is doing well. Her daughter lives in [place], they are moving to near [facility]. She is apprehensive about the move obviously, but they are happy to be nearer her family.     Her mental health is fairly stable. She does note that anxiety is her main symptom. She does take clonazepam very sparingly. She does plan on getting established with a mental health provider when she gets out there. She hasn't been doing therapy lately. She is exercising fairly regularly.     She does have several keratoses that she would like to have looked at and frozen today.           6/2/2024   General Health   How would you rate your overall physical health? Good   Feel stress (tense, anxious, or unable to sleep) To some extent   (!) STRESS CONCERN      6/2/2024   Nutrition   Diet: Other   If other, please elaborate: I don t have a special diet         6/2/2024   Exercise   Days per week of moderate/strenous exercise 5 days   Average minutes spent exercising at this level 60 min         6/2/2024   Social Factors   Frequency of gathering with friends or relatives Patient declined   Worry food won't last until get money to buy more No   Food not last or not have enough money for food? No   Do you have housing?  Yes   Are you worried about losing your housing? No   Lack of transportation? No   Unable to  get utilities (heat,electricity)? No         6/2/2024   Fall Risk   Fallen 2 or more times in the past year? No   Trouble with walking or balance? No          6/2/2024   Activities of Daily Living- Home Safety   Needs help with the following daily activites None of the above   Safety concerns in the home None of the above         6/2/2024   Dental   Dentist two times every year? Yes         6/2/2024   Hearing Screening   Hearing concerns? None of the above         6/2/2024   Driving Risk Screening   Patient/family members have concerns about driving No         6/2/2024   General Alertness/Fatigue Screening   Have you been more tired than usual lately? No         6/2/2024   Urinary Incontinence Screening   Bothered by leaking urine in past 6 months No         6/2/2024   TB Screening   Were you born outside of the [place]? No         Today's PHQ-2 Score:       6/4/2024    11:34 AM   PHQ-2 ( 1999 Pfizer)   Q1: Little interest or pleasure in doing things 1   Q2: Feeling down, depressed or hopeless 1   PHQ-2 Score 2   Q1: Little interest or pleasure in doing things Several days   Q2: Feeling down, depressed or hopeless Several days   PHQ-2 Score 2           6/2/2024   Substance Use   Alcohol more than 3/day or more than 7/wk No   Do you have a current opioid prescription? No   How severe/bad is pain from 1 to 10? 0/10 (No Pain)   Do you use any other substances recreationally? No     Social History     Tobacco Use    Smoking status: Never    Smokeless tobacco: Never           10/17/2023   LAST FHS-7 RESULTS   1st degree relative breast or ovarian cancer No   Any relative bilateral breast cancer No   Any male have breast cancer No   Any ONE woman have BOTH breast AND ovarian cancer No   Any woman with breast cancer before 50yrs No   2 or more relatives with breast AND/OR ovarian cancer No   2 or more relatives with breast AND/OR bowel cancer No        Mammogram Screening - Mammogram every 1-2 years updated in Health  Maintenance based on mutual decision making      History of abnormal Pap smear: No - age 65 or older with adequate negative prior screening test results (3 consecutive negative cytology results, 2 consecutive negative cotesting results, or 2 consecutive negative HrHPV test results within 10 years, with the most recent test occurring within the recommended screening interval for the test used)        Latest Ref Rng & Units 9/16/2019    10:03 AM   PAP / HPV   PAP Negative for squamous intraepithelial lesion or malignancy. Negative for squamous intraepithelial lesion or malignancy  Electronically signed by Cassy Thomas CT (ASCP) on 9/23/2019 at 11:49 AM      HPV 16 DNA NEG Negative    HPV 18 DNA NEG Negative    Other HR HPV NEG Negative      ASCVD Risk   The 10-year ASCVD risk score (Steve DK, et al., 2019) is: 5.8%    Values used to calculate the score:      Age: 69 years      Sex: Female      Is Non- : No      Diabetic: No      Tobacco smoker: No      Systolic Blood Pressure: 100 mmHg      Is BP treated: No      HDL Cholesterol: 95 mg/dL      Total Cholesterol: 252 mg/dL            Reviewed and updated as needed this visit by Provider   Tobacco  Allergies  Meds  Problems  Med Hx  Surg Hx  Fam Hx              Current providers sharing in care for this patient include:  Patient Care Team:  Faiza Myles MD as PCP - General (Family Medicine)  Faiza Myles MD as Assigned PCP  Peña Sawyer MD as Assigned Behavioral Health Provider    The following health maintenance items are reviewed in Epic and correct as of today:  Health Maintenance   Topic Date Due    RSV VACCINE (Pregnancy & 60+) (1 - 1-dose 60+ series) Never done    COVID-19 Vaccine (3 - 2023-24 season) 09/01/2023    INFLUENZA VACCINE (Season Ended) 09/01/2024    MEDICARE ANNUAL WELLNESS VISIT  10/10/2024    COLORECTAL CANCER SCREENING  12/29/2024    ANNUAL REVIEW OF HM ORDERS  06/05/2025    FALL  "RISK ASSESSMENT  06/05/2025    MAMMO SCREENING  10/17/2025    DTAP/TDAP/TD IMMUNIZATION (2 - Td or Tdap) 11/11/2025    GLUCOSE  10/10/2026    LIPID  10/10/2028    ADVANCE CARE PLANNING  10/10/2028    DEXA  08/11/2038    PHQ-2 (once per calendar year)  Completed    Pneumococcal Vaccine: 65+ Years  Completed    ZOSTER IMMUNIZATION  Completed    IPV IMMUNIZATION  Aged Out    HPV IMMUNIZATION  Aged Out    MENINGITIS IMMUNIZATION  Aged Out    RSV MONOCLONAL ANTIBODY  Aged Out    HEPATITIS C SCREENING  Discontinued         Review of Systems  Constitutional, HEENT, cardiovascular, pulmonary, gi and gu systems are negative, except as otherwise noted.     Objective    Exam  /60   Pulse 70   Temp 97.8  F (36.6  C)   Resp 17   Ht 1.689 m (5' 6.5\")   Wt 71.7 kg (158 lb)   LMP  (LMP Unknown)   SpO2 96%   BMI 25.12 kg/m     Estimated body mass index is 25.12 kg/m  as calculated from the following:    Height as of this encounter: 1.689 m (5' 6.5\").    Weight as of this encounter: 71.7 kg (158 lb).    Physical Exam  GENERAL: alert and no distress  EYES: Eyes grossly normal to inspection, PERRL and conjunctivae and sclerae normal  HENT: ear canals and TM's normal, nose and mouth without ulcers or lesions  NECK: no adenopathy, no asymmetry, masses, or scars  RESP: lungs clear to auscultation - no rales, rhonchi or wheezes  CV: regular rate and rhythm, normal S1 S2, no S3 or S4, no murmur, click or rub, no peripheral edema  ABDOMEN: soft, nontender, no hepatosplenomegaly, no masses and bowel sounds normal  MS: no gross musculoskeletal defects noted, no edema  SKIN: no suspicious lesions or rashes  SKIN: keratoses - seborrheic and seborrheic # 10 - frozen  NEURO: Normal strength and tone, mentation intact and speech normal  PSYCH: mentation appears normal, affect normal/bright         No data to display                  Procedure note:    Consent: Risks and benefits of therapy discussed with patient who voices " understanding and agrees with planned care. No barriers to communication or understanding identified.  After obtaining informed consent, the patient's identity, procedure, and site were verified during a pause prior to proceeding with the minor surgical procedure as per universal protocol recommendations.  10 Seborrheic keratoses were cryocautery with freeze thaw freeze technique with 2-3 mm surround freeze.. Local care discussed. Side effects of treatment and precautions discussed. All questions answered. To follow up if worse or any new problems      Signed Electronically by: Faiza Myles MD

## 2024-06-06 LAB
ALBUMIN SERPL BCG-MCNC: 4.3 G/DL (ref 3.5–5.2)
ALP SERPL-CCNC: 77 U/L (ref 40–150)
ALT SERPL W P-5'-P-CCNC: 33 U/L (ref 0–50)
ANION GAP SERPL CALCULATED.3IONS-SCNC: 11 MMOL/L (ref 7–15)
AST SERPL W P-5'-P-CCNC: 31 U/L (ref 0–45)
BILIRUB SERPL-MCNC: 1.2 MG/DL
BUN SERPL-MCNC: 14.5 MG/DL (ref 8–23)
CALCIUM SERPL-MCNC: 9.4 MG/DL (ref 8.8–10.2)
CHLORIDE SERPL-SCNC: 105 MMOL/L (ref 98–107)
CHOLEST SERPL-MCNC: 241 MG/DL
CREAT SERPL-MCNC: 0.81 MG/DL (ref 0.51–0.95)
DEPRECATED HCO3 PLAS-SCNC: 24 MMOL/L (ref 22–29)
EGFRCR SERPLBLD CKD-EPI 2021: 78 ML/MIN/1.73M2
FASTING STATUS PATIENT QL REPORTED: YES
FASTING STATUS PATIENT QL REPORTED: YES
GLUCOSE SERPL-MCNC: 96 MG/DL (ref 70–99)
HDLC SERPL-MCNC: 94 MG/DL
LDLC SERPL CALC-MCNC: 135 MG/DL
NONHDLC SERPL-MCNC: 147 MG/DL
POTASSIUM SERPL-SCNC: 4.1 MMOL/L (ref 3.4–5.3)
PROT SERPL-MCNC: 6.9 G/DL (ref 6.4–8.3)
SODIUM SERPL-SCNC: 140 MMOL/L (ref 135–145)
TRIGL SERPL-MCNC: 62 MG/DL

## 2024-06-10 ENCOUNTER — TELEPHONE (OUTPATIENT)
Dept: FAMILY MEDICINE | Facility: CLINIC | Age: 69
End: 2024-06-10
Payer: COMMERCIAL

## 2024-06-10 NOTE — TELEPHONE ENCOUNTER
Pt notified form completed and faxed to number on form.    Helene Kumar MA on 6/10/2024 at 11:03 AM

## 2024-07-01 ENCOUNTER — TRANSFERRED RECORDS (OUTPATIENT)
Dept: HEALTH INFORMATION MANAGEMENT | Facility: CLINIC | Age: 69
End: 2024-07-01
Payer: COMMERCIAL

## 2024-11-23 DIAGNOSIS — G47.00 INSOMNIA, UNSPECIFIED TYPE: ICD-10-CM

## 2024-11-24 RX ORDER — ESZOPICLONE 1 MG/1
1 TABLET, FILM COATED ORAL AT BEDTIME
Qty: 30 TABLET | Refills: 2 | Status: SHIPPED | OUTPATIENT
Start: 2024-11-24

## 2025-02-23 DIAGNOSIS — G47.00 INSOMNIA, UNSPECIFIED TYPE: ICD-10-CM

## 2025-02-23 DIAGNOSIS — F41.1 GENERALIZED ANXIETY DISORDER: ICD-10-CM

## 2025-02-23 NOTE — TELEPHONE ENCOUNTER
Medication Question or Refill    Contacts       Contact Date/Time Type Contact Phone/Fax    02/23/2025 05:44 PM CST Phone (Incoming) Nandini Yang (Self) [phone] (M)     Requesting medication refill            What medication are you calling about (include dose and sig)?: eszopiclone (LUNESTA) 1 MG tablet     clonazePAM (KLONOPIN) 1 MG tablet       Preferred Pharmacy:   [facility]/pharmacy #[id number] - [place], [place] - [address] AT [facility]. & [address] [address]  Phone: [phone] Fax: [phone]    Controlled Substance Agreement on file:   CSA -- Patient Level:    CSA: None found at the patient level.       Who prescribed the medication?: Faiza Myles MD     Do you need a refill? Yes    When did you use the medication last? 2/22/25    Patient offered an appointment? [place] - [place] state    Do you have any questions or concerns?  Yes: Nandini is having a hard time finding a PCP in [place] so she was hoping to at least have another refill of the medications while she is still in process of finding a new doctor      Could we send this information to you in [facility] or would you prefer to receive a phone call?:   Patient would prefer a phone call   Okay to leave a detailed message?: Yes at Cell number on file:    Telephone Information:   Mobile [phone]

## 2025-02-24 RX ORDER — ESZOPICLONE 1 MG/1
1 TABLET, FILM COATED ORAL AT BEDTIME
Qty: 30 TABLET | Refills: 2 | Status: SHIPPED | OUTPATIENT
Start: 2025-02-24

## 2025-02-24 RX ORDER — CLONAZEPAM 1 MG/1
1 TABLET ORAL PRN
Qty: 10 TABLET | Refills: 3 | Status: SHIPPED | OUTPATIENT
Start: 2025-02-24

## 2025-02-24 NOTE — TELEPHONE ENCOUNTER
Patient's  is picking it up sometime in March and it needs to  be sent to [facility] in [facility] in [place].    BRENT Forde RN

## 2025-02-24 NOTE — TELEPHONE ENCOUNTER
Left message to call back for: pt  Information to relay to patient: pharmacy marked on refill is for [facility], note states that pt is living in [place]. Please clarify pharmacy and send back to care team.

## 2025-04-22 DIAGNOSIS — F41.1 GENERALIZED ANXIETY DISORDER: ICD-10-CM

## 2025-04-23 RX ORDER — QUETIAPINE FUMARATE 50 MG/1
100 TABLET, EXTENDED RELEASE ORAL
Qty: 180 TABLET | Refills: 1 | Status: SHIPPED | OUTPATIENT
Start: 2025-04-23

## 2025-05-08 ENCOUNTER — PATIENT OUTREACH (OUTPATIENT)
Dept: CARE COORDINATION | Facility: CLINIC | Age: 70
End: 2025-05-08
Payer: COMMERCIAL

## 2025-07-13 ENCOUNTER — HEALTH MAINTENANCE LETTER (OUTPATIENT)
Age: 70
End: 2025-07-13

## 2025-08-18 ENCOUNTER — PATIENT OUTREACH (OUTPATIENT)
Dept: FAMILY MEDICINE | Facility: CLINIC | Age: 70
End: 2025-08-18
Payer: COMMERCIAL